# Patient Record
Sex: FEMALE | Employment: OTHER | URBAN - METROPOLITAN AREA
[De-identification: names, ages, dates, MRNs, and addresses within clinical notes are randomized per-mention and may not be internally consistent; named-entity substitution may affect disease eponyms.]

---

## 2020-11-05 LAB
ALBUMIN SERPL-MCNC: 3.3 G/DL (ref 3.5–4.6)
ALP BLD-CCNC: 51 U/L (ref 40–130)
ALT SERPL-CCNC: 7 U/L (ref 0–33)
ANION GAP SERPL CALCULATED.3IONS-SCNC: 23 MEQ/L (ref 9–15)
AST SERPL-CCNC: 18 U/L (ref 0–35)
BILIRUB SERPL-MCNC: <0.2 MG/DL (ref 0.2–0.7)
BUN BLDV-MCNC: 19 MG/DL (ref 8–23)
C-REACTIVE PROTEIN: 4 MG/L (ref 0–5)
CALCIUM SERPL-MCNC: 9 MG/DL (ref 8.5–9.9)
CHLORIDE BLD-SCNC: 102 MEQ/L (ref 95–107)
CO2: 13 MEQ/L (ref 20–31)
CREAT SERPL-MCNC: 0.6 MG/DL (ref 0.5–0.9)
D DIMER: 0.51 MG/L FEU (ref 0–0.5)
FERRITIN: 147.9 NG/ML (ref 13–150)
GFR AFRICAN AMERICAN: >60
GFR NON-AFRICAN AMERICAN: >60
GLOBULIN: 3.3 G/DL (ref 2.3–3.5)
GLUCOSE BLD-MCNC: 202 MG/DL (ref 70–99)
HCT VFR BLD CALC: 38 % (ref 37–47)
HEMOGLOBIN: 12.5 G/DL (ref 12–16)
LACTATE DEHYDROGENASE: 203 U/L (ref 135–214)
MCH RBC QN AUTO: 29.4 PG (ref 27–31.3)
MCHC RBC AUTO-ENTMCNC: 32.9 % (ref 33–37)
MCV RBC AUTO: 89.4 FL (ref 82–100)
PDW BLD-RTO: 13.8 % (ref 11.5–14.5)
PLATELET # BLD: 101 K/UL (ref 130–400)
POTASSIUM SERPL-SCNC: 4.3 MEQ/L (ref 3.4–4.9)
RBC # BLD: 4.25 M/UL (ref 4.2–5.4)
SODIUM BLD-SCNC: 138 MEQ/L (ref 135–144)
TOTAL CK: 41 U/L (ref 0–170)
TOTAL PROTEIN: 6.6 G/DL (ref 6.3–8)
TROPONIN: <0.01 NG/ML (ref 0–0.01)
WBC # BLD: 1.8 K/UL (ref 4.8–10.8)

## 2020-11-06 ENCOUNTER — OFFICE VISIT (OUTPATIENT)
Dept: GERIATRIC MEDICINE | Age: 85
End: 2020-11-06
Payer: COMMERCIAL

## 2020-11-06 DIAGNOSIS — U07.1 COVID-19: Primary | ICD-10-CM

## 2020-11-06 PROCEDURE — G8484 FLU IMMUNIZE NO ADMIN: HCPCS | Performed by: NURSE PRACTITIONER

## 2020-11-06 PROCEDURE — 1123F ACP DISCUSS/DSCN MKR DOCD: CPT | Performed by: NURSE PRACTITIONER

## 2020-11-06 PROCEDURE — 99308 SBSQ NF CARE LOW MDM 20: CPT | Performed by: NURSE PRACTITIONER

## 2020-11-07 ENCOUNTER — OFFICE VISIT (OUTPATIENT)
Dept: GERIATRIC MEDICINE | Age: 85
End: 2020-11-07
Payer: COMMERCIAL

## 2020-11-07 LAB — PROCALCITONIN: <0.07 NG/ML

## 2020-11-07 PROCEDURE — G8484 FLU IMMUNIZE NO ADMIN: HCPCS | Performed by: INTERNAL MEDICINE

## 2020-11-07 PROCEDURE — 1123F ACP DISCUSS/DSCN MKR DOCD: CPT | Performed by: INTERNAL MEDICINE

## 2020-11-07 PROCEDURE — 99309 SBSQ NF CARE MODERATE MDM 30: CPT | Performed by: INTERNAL MEDICINE

## 2020-11-08 ENCOUNTER — OFFICE VISIT (OUTPATIENT)
Dept: GERIATRIC MEDICINE | Age: 85
End: 2020-11-08
Payer: COMMERCIAL

## 2020-11-08 PROCEDURE — G8484 FLU IMMUNIZE NO ADMIN: HCPCS | Performed by: INTERNAL MEDICINE

## 2020-11-08 PROCEDURE — 1123F ACP DISCUSS/DSCN MKR DOCD: CPT | Performed by: INTERNAL MEDICINE

## 2020-11-08 PROCEDURE — 99309 SBSQ NF CARE MODERATE MDM 30: CPT | Performed by: INTERNAL MEDICINE

## 2020-11-09 ENCOUNTER — OFFICE VISIT (OUTPATIENT)
Dept: GERIATRIC MEDICINE | Age: 85
End: 2020-11-09
Payer: COMMERCIAL

## 2020-11-09 DIAGNOSIS — U07.1 COVID-19: Primary | ICD-10-CM

## 2020-11-09 PROCEDURE — 99308 SBSQ NF CARE LOW MDM 20: CPT | Performed by: NURSE PRACTITIONER

## 2020-11-09 PROCEDURE — 1123F ACP DISCUSS/DSCN MKR DOCD: CPT | Performed by: NURSE PRACTITIONER

## 2020-11-09 PROCEDURE — G8484 FLU IMMUNIZE NO ADMIN: HCPCS | Performed by: NURSE PRACTITIONER

## 2020-11-10 ENCOUNTER — OFFICE VISIT (OUTPATIENT)
Dept: GERIATRIC MEDICINE | Age: 85
End: 2020-11-10
Payer: COMMERCIAL

## 2020-11-10 PROCEDURE — 99309 SBSQ NF CARE MODERATE MDM 30: CPT | Performed by: INTERNAL MEDICINE

## 2020-11-10 PROCEDURE — G8484 FLU IMMUNIZE NO ADMIN: HCPCS | Performed by: INTERNAL MEDICINE

## 2020-11-10 PROCEDURE — 1123F ACP DISCUSS/DSCN MKR DOCD: CPT | Performed by: INTERNAL MEDICINE

## 2020-11-11 ENCOUNTER — OFFICE VISIT (OUTPATIENT)
Dept: GERIATRIC MEDICINE | Age: 85
End: 2020-11-11
Payer: COMMERCIAL

## 2020-11-11 DIAGNOSIS — R09.89 ABNORMAL LUNG SOUNDS: Primary | ICD-10-CM

## 2020-11-11 PROCEDURE — G8484 FLU IMMUNIZE NO ADMIN: HCPCS | Performed by: NURSE PRACTITIONER

## 2020-11-11 PROCEDURE — 99309 SBSQ NF CARE MODERATE MDM 30: CPT | Performed by: NURSE PRACTITIONER

## 2020-11-11 PROCEDURE — 1123F ACP DISCUSS/DSCN MKR DOCD: CPT | Performed by: NURSE PRACTITIONER

## 2020-11-12 ENCOUNTER — OFFICE VISIT (OUTPATIENT)
Dept: GERIATRIC MEDICINE | Age: 85
End: 2020-11-12
Payer: COMMERCIAL

## 2020-11-12 LAB
ALBUMIN SERPL-MCNC: 3.6 G/DL (ref 3.5–4.6)
ALP BLD-CCNC: 50 U/L (ref 40–130)
ALT SERPL-CCNC: <5 U/L (ref 0–33)
ANION GAP SERPL CALCULATED.3IONS-SCNC: 12 MEQ/L (ref 9–15)
AST SERPL-CCNC: 20 U/L (ref 0–35)
BILIRUB SERPL-MCNC: 0.3 MG/DL (ref 0.2–0.7)
BUN BLDV-MCNC: 17 MG/DL (ref 8–23)
C-REACTIVE PROTEIN: 1.3 MG/L (ref 0–5)
CALCIUM SERPL-MCNC: 9.2 MG/DL (ref 8.5–9.9)
CHLORIDE BLD-SCNC: 106 MEQ/L (ref 95–107)
CO2: 22 MEQ/L (ref 20–31)
CREAT SERPL-MCNC: 0.56 MG/DL (ref 0.5–0.9)
D DIMER: 0.63 MG/L FEU (ref 0–0.5)
FERRITIN: 161.6 NG/ML (ref 13–150)
GFR AFRICAN AMERICAN: >60
GFR NON-AFRICAN AMERICAN: >60
GLOBULIN: 3.1 G/DL (ref 2.3–3.5)
GLUCOSE BLD-MCNC: 99 MG/DL (ref 70–99)
HCT VFR BLD CALC: 38.4 % (ref 37–47)
HEMOGLOBIN: 12.7 G/DL (ref 12–16)
LACTATE DEHYDROGENASE: 290 U/L (ref 135–214)
MCH RBC QN AUTO: 29.6 PG (ref 27–31.3)
MCHC RBC AUTO-ENTMCNC: 33.1 % (ref 33–37)
MCV RBC AUTO: 89.4 FL (ref 82–100)
PDW BLD-RTO: 13.2 % (ref 11.5–14.5)
PLATELET # BLD: 147 K/UL (ref 130–400)
POTASSIUM SERPL-SCNC: 4.3 MEQ/L (ref 3.4–4.9)
RBC # BLD: 4.3 M/UL (ref 4.2–5.4)
SODIUM BLD-SCNC: 140 MEQ/L (ref 135–144)
TOTAL CK: 41 U/L (ref 0–170)
TOTAL PROTEIN: 6.7 G/DL (ref 6.3–8)
TSH SERPL DL<=0.05 MIU/L-ACNC: 1.06 UIU/ML (ref 0.44–3.86)
WBC # BLD: 2.2 K/UL (ref 4.8–10.8)

## 2020-11-12 PROCEDURE — G8484 FLU IMMUNIZE NO ADMIN: HCPCS | Performed by: INTERNAL MEDICINE

## 2020-11-12 PROCEDURE — 99309 SBSQ NF CARE MODERATE MDM 30: CPT | Performed by: INTERNAL MEDICINE

## 2020-11-12 PROCEDURE — 1123F ACP DISCUSS/DSCN MKR DOCD: CPT | Performed by: INTERNAL MEDICINE

## 2020-11-12 RX ORDER — ALBUTEROL SULFATE 90 UG/1
1 AEROSOL, METERED RESPIRATORY (INHALATION)
Qty: 1 INHALER | Refills: 3 | Status: SHIPPED | OUTPATIENT
Start: 2020-11-12

## 2020-11-12 RX ORDER — DEXAMETHASONE 0.5 MG/1
6 TABLET ORAL
Qty: 120 TABLET | Refills: 0 | Status: SHIPPED | OUTPATIENT
Start: 2020-11-12 | End: 2020-11-22

## 2020-11-12 RX ORDER — ALBUTEROL SULFATE 90 UG/1
1 AEROSOL, METERED RESPIRATORY (INHALATION) EVERY 4 HOURS
Qty: 1 INHALER | Refills: 3 | Status: SHIPPED | OUTPATIENT
Start: 2020-11-12

## 2020-11-13 ENCOUNTER — OFFICE VISIT (OUTPATIENT)
Dept: GERIATRIC MEDICINE | Age: 85
End: 2020-11-13
Payer: COMMERCIAL

## 2020-11-13 PROCEDURE — G8484 FLU IMMUNIZE NO ADMIN: HCPCS | Performed by: NURSE PRACTITIONER

## 2020-11-13 PROCEDURE — 99309 SBSQ NF CARE MODERATE MDM 30: CPT | Performed by: NURSE PRACTITIONER

## 2020-11-13 PROCEDURE — 1123F ACP DISCUSS/DSCN MKR DOCD: CPT | Performed by: NURSE PRACTITIONER

## 2020-11-14 ENCOUNTER — VIRTUAL VISIT (OUTPATIENT)
Dept: GERIATRIC MEDICINE | Age: 85
End: 2020-11-14
Payer: COMMERCIAL

## 2020-11-14 LAB — PROCALCITONIN: 0.08 NG/ML

## 2020-11-14 PROCEDURE — 99308 SBSQ NF CARE LOW MDM 20: CPT | Performed by: NURSE PRACTITIONER

## 2020-11-14 PROCEDURE — 1123F ACP DISCUSS/DSCN MKR DOCD: CPT | Performed by: NURSE PRACTITIONER

## 2020-11-15 ENCOUNTER — VIRTUAL VISIT (OUTPATIENT)
Dept: GERIATRIC MEDICINE | Age: 85
End: 2020-11-15
Payer: COMMERCIAL

## 2020-11-15 PROCEDURE — 99308 SBSQ NF CARE LOW MDM 20: CPT | Performed by: NURSE PRACTITIONER

## 2020-11-15 PROCEDURE — 1123F ACP DISCUSS/DSCN MKR DOCD: CPT | Performed by: NURSE PRACTITIONER

## 2020-11-16 ENCOUNTER — OFFICE VISIT (OUTPATIENT)
Dept: GERIATRIC MEDICINE | Age: 85
End: 2020-11-16
Payer: COMMERCIAL

## 2020-11-16 PROCEDURE — 1123F ACP DISCUSS/DSCN MKR DOCD: CPT | Performed by: NURSE PRACTITIONER

## 2020-11-16 PROCEDURE — 99308 SBSQ NF CARE LOW MDM 20: CPT | Performed by: NURSE PRACTITIONER

## 2020-11-16 PROCEDURE — G8484 FLU IMMUNIZE NO ADMIN: HCPCS | Performed by: NURSE PRACTITIONER

## 2020-11-18 ENCOUNTER — OFFICE VISIT (OUTPATIENT)
Dept: GERIATRIC MEDICINE | Age: 85
End: 2020-11-18
Payer: COMMERCIAL

## 2020-11-18 DIAGNOSIS — G47.00 INSOMNIA, UNSPECIFIED TYPE: Primary | ICD-10-CM

## 2020-11-18 PROCEDURE — 99308 SBSQ NF CARE LOW MDM 20: CPT | Performed by: NURSE PRACTITIONER

## 2020-11-18 PROCEDURE — G8484 FLU IMMUNIZE NO ADMIN: HCPCS | Performed by: NURSE PRACTITIONER

## 2020-11-18 PROCEDURE — 1123F ACP DISCUSS/DSCN MKR DOCD: CPT | Performed by: NURSE PRACTITIONER

## 2020-11-19 LAB
ALBUMIN SERPL-MCNC: 4 G/DL (ref 3.5–4.6)
ALP BLD-CCNC: 54 U/L (ref 40–130)
ALT SERPL-CCNC: 16 U/L (ref 0–33)
ANION GAP SERPL CALCULATED.3IONS-SCNC: 25 MEQ/L (ref 9–15)
AST SERPL-CCNC: 16 U/L (ref 0–35)
BILIRUB SERPL-MCNC: 0.4 MG/DL (ref 0.2–0.7)
BUN BLDV-MCNC: 42 MG/DL (ref 8–23)
C-REACTIVE PROTEIN: 1.5 MG/L (ref 0–5)
CALCIUM SERPL-MCNC: 9.4 MG/DL (ref 8.5–9.9)
CHLORIDE BLD-SCNC: 104 MEQ/L (ref 95–107)
CO2: 13 MEQ/L (ref 20–31)
CREAT SERPL-MCNC: 0.58 MG/DL (ref 0.5–0.9)
D DIMER: >20 MG/L FEU (ref 0–0.5)
FERRITIN: 177.3 NG/ML (ref 13–150)
GFR AFRICAN AMERICAN: >60
GFR NON-AFRICAN AMERICAN: >60
GLOBULIN: 2.7 G/DL (ref 2.3–3.5)
GLUCOSE BLD-MCNC: 194 MG/DL (ref 70–99)
HCT VFR BLD CALC: 40.2 % (ref 37–47)
HEMOGLOBIN: 13 G/DL (ref 12–16)
LACTATE DEHYDROGENASE: 321 U/L (ref 135–214)
MCH RBC QN AUTO: 29.6 PG (ref 27–31.3)
MCHC RBC AUTO-ENTMCNC: 32.2 % (ref 33–37)
MCV RBC AUTO: 91.8 FL (ref 82–100)
PDW BLD-RTO: 14.2 % (ref 11.5–14.5)
PLATELET # BLD: 188 K/UL (ref 130–400)
POTASSIUM SERPL-SCNC: 4.8 MEQ/L (ref 3.4–4.9)
PROCALCITONIN: 0.07 NG/ML (ref 0–0.15)
RBC # BLD: 4.38 M/UL (ref 4.2–5.4)
SODIUM BLD-SCNC: 142 MEQ/L (ref 135–144)
TOTAL CK: 28 U/L (ref 0–170)
TOTAL PROTEIN: 6.7 G/DL (ref 6.3–8)
TROPONIN: <0.01 NG/ML (ref 0–0.01)
WBC # BLD: 8.3 K/UL (ref 4.8–10.8)

## 2020-11-23 LAB
ANION GAP SERPL CALCULATED.3IONS-SCNC: 11 MEQ/L (ref 9–15)
BUN BLDV-MCNC: 28 MG/DL (ref 8–23)
CALCIUM SERPL-MCNC: 9.3 MG/DL (ref 8.5–9.9)
CHLORIDE BLD-SCNC: 102 MEQ/L (ref 95–107)
CO2: 22 MEQ/L (ref 20–31)
CREAT SERPL-MCNC: 0.51 MG/DL (ref 0.5–0.9)
GFR AFRICAN AMERICAN: >60
GFR NON-AFRICAN AMERICAN: >60
GLUCOSE BLD-MCNC: 169 MG/DL (ref 70–99)
POTASSIUM SERPL-SCNC: 4.4 MEQ/L (ref 3.4–4.9)
SODIUM BLD-SCNC: 135 MEQ/L (ref 135–144)

## 2020-11-25 LAB — D DIMER: 1.74 MG/L FEU (ref 0–0.5)

## 2020-12-08 NOTE — PROGRESS NOTES
Subjective:     CC: COVID-19    HPI:  Shlomo Noyola is a 80 y.o. female was seen today for COVID 19 evaluation. Patient has been COVID 19 positive since 11/5. They were seen with full PPE and observing continued droplet precautions. Condition discussed with nursing staff and treatment reviewed. Recent bloodwork, chest x-ray and vital signs reviewed. Fever curve and oxygen demands reviewed. Nutritional status and intake reviewed. Patient is demonstrating increased respiratory complaints at this time. Patient has not been febrile in the last 24 hours. Patient is able to feed themselves. No past medical history on file. No past surgical history on file. No family history on file.   Social History     Socioeconomic History    Marital status:      Spouse name: Not on file    Number of children: Not on file    Years of education: Not on file    Highest education level: Not on file   Occupational History    Not on file   Social Needs    Financial resource strain: Not on file    Food insecurity     Worry: Not on file     Inability: Not on file    Transportation needs     Medical: Not on file     Non-medical: Not on file   Tobacco Use    Smoking status: Not on file   Substance and Sexual Activity    Alcohol use: Not on file    Drug use: Not on file    Sexual activity: Not on file   Lifestyle    Physical activity     Days per week: Not on file     Minutes per session: Not on file    Stress: Not on file   Relationships    Social connections     Talks on phone: Not on file     Gets together: Not on file     Attends [employer] service: Not on file     Active member of club or organization: Not on file     Attends meetings of clubs or organizations: Not on file     Relationship status: Not on file    Intimate partner violence     Fear of current or ex partner: Not on file     Emotionally abused: Not on file     Physically abused: Not on file     Forced sexual activity: Not on file Other Topics Concern    Not on file   Social History Narrative    Not on file     No current outpatient medications on file prior to visit. No current facility-administered medications on file prior to visit. Review of Systems   Unable to perform ROS: Mental status change       Objective:     Physical Exam  Vitals signs reviewed. Constitutional:       Appearance: Normal appearance. HENT:      Head: Normocephalic and atraumatic. Nose: Nose normal.      Mouth/Throat:      Mouth: Mucous membranes are dry. Eyes:      Extraocular Movements: Extraocular movements intact. Neck:      Musculoskeletal: Normal range of motion. Cardiovascular:      Rate and Rhythm: Normal rate and regular rhythm. Pulmonary:      Breath sounds: No wheezing or rhonchi. Abdominal:      Palpations: Abdomen is soft. Tenderness: There is no abdominal tenderness. Musculoskeletal:         General: No swelling. Skin:     General: Skin is warm and dry. Neurological:      Mental Status: She is alert. She is disoriented. Comments: tremor          . [address]  Lab Results   Component Value Date    FERRITIN 177.3 (H) 11/19/2020     Lab Results   Component Value Date    WBC 8.3 11/19/2020    HGB 13.0 11/19/2020    HCT 40.2 11/19/2020    MCV 91.8 11/19/2020     11/19/2020     Lab Results   Component Value Date     11/23/2020    K 4.4 11/23/2020     11/23/2020    CO2 22 11/23/2020    BUN 28 11/23/2020    CREATININE 0.51 11/23/2020    GLUCOSE 169 11/23/2020    GLUCOSE 112 06/05/2012    CALCIUM 9.3 11/23/2020      Lab Results   Component Value Date    CKTOTAL 28 11/19/2020    TROPONINI <0.010 11/19/2020     Lab Results   Component Value Date    CRP 1.5 11/19/2020      Lab Results   Component Value Date    DDIMER 1.74 ([facility]) 11/25/2020      Lab Results   Component Value Date    CKTOTAL 28 11/19/2020       Xr Chest (2 Vw)    Result Date: 11/11/2020  Scanned in image     . lastchestxray      Assessment & Plan:     Continue with current regimen of  Vitamin C 500mg PO BID  Vitamin D 1000 IU PO QD  Zinc 50 mg PO QD  Lovenox 30mg SQ BID    Patient does require Dexamethasone 6mg PO QD  Patient does not  require antibiotics for concomitant bacterial pneumonia. Continue to monitor blood work:  Weekly D-Dimer, LDH, CRP, CPK, Procalcitonin, Ferritin, Troponin, CBC, BMP. Patient's case discussed with nursing staff and Code Status reviewed.     Juan Pablo Reyes MD

## 2020-12-09 NOTE — PROGRESS NOTES
[address]  Chief Complaint   Patient presents with    Other     covid    Other     bronchitis       The patient is being seen today for acute visit for COVID-19 bronchitis. SUBJECTIVE:  Resident offers no concerns. States she is doing well. Nursing staff report resident remains at her baseline. FAMILY AND SOCIAL HISTORY:  Refer to H&P. No past medical history on file. MEDICATIONS AND ALLERGIES:  Reviewed on chart in Epic. REVIEW OF SYSTEMS:  Resident denies any headache, dizziness, blurred vision, sore throat, shortness of breath, chest pain, abdominal pain, nausea, vomiting, or changes in her bowel or bladder habits. She reports she is eating okay and sleeping okay. PHYSICAL EXAMINATION:  Most recent vital signs, blood pressure 127/66, temp 98.0, heart rate 68, respirations 18, pulse oxing 96% on 2 L of O2.  CONSTITUTIONAL:  Resident is alert and elderly female, in no apparent distress, cooperative with exam.  INTEGUMENT:  Pink and dry. HEENT:  Normocephalic, atraumatic in appearance, hard of hearing. Conjunctivae pink. Sclerae nonicteric. Mucous membranes pink moist.  Neck with no visible masses. CARDIOVASCULAR:  Regular per nursing staff. RESPIRATORY:  lung sounds clear throughout all fields per nursing staff. Respirations even and nonlabored. No use of accessory muscles noted with breathing. No cough noted. ABDOMEN:  Soft with positive bowel sounds per nursing staff. PERIPHERAL VASCULAR:  No edema noted. ASSESSMENT AND PLAN:  COVID bronchitis. Resident is tolerating her antibiotic therapy. She states she is feeling better. We will continue to monitor her closely. No further changes will be made at this time. Return in about 1 day (around 11/15/2020).   Pursuant to the emergency declaration under the [facility] authority and the [employer] Act, this Virtual Visit was conducted, with patient's consent, to reduce the patient's risk of exposure to COVID-19 and provide continuity of care for an established patient. Services were provided through a video synchronous discussion virtually to substitute for in-person clinic visit they will be billed for this visit and they agree to continue with assistance of  staff and via VIRTUAL platform     Patient identification was verified at the start of the visit : YES    Total time spent on this encounter: Not billed by time. Electronically Signed By: Delia Story CNP on 12/01/2020 03:57:06  ______________________________  Delia Story CNP  [id number]  D: 11/30/2020 20:08:45  T: 11/30/2020 20:58:15    cc: - [facility]

## 2020-12-13 PROBLEM — J40 BRONCHITIS: Status: ACTIVE | Noted: 2020-12-13

## 2020-12-13 PROBLEM — U07.1 COVID-19: Status: ACTIVE | Noted: 2020-12-13

## 2020-12-14 NOTE — PROGRESS NOTES
[address]  Chief Complaint   Patient presents with    Other     covid    Other     bronchitis       The patient is being seen today for acute visit for COVID bronchitis. SUBJECTIVE:  Nursing staff report resident remained stable and she has been at her baseline. She has not had any hypoxic episodes. FAMILY AND SOCIAL HISTORY:  Refer to H&P. No past medical history on file. MEDICATIONS AND ALLERGIES:  Reviewed on chart in Epic. REVIEW OF SYSTEMS:  Resident denies any headache, dizziness, blurred vision,sore throat, cough, chest pain, shortness of breath, abdominal pain, nausea, vomiting, or changes in her bowel or bladder habits. She reports she is eating well, sleeping well, and has no pain. PHYSICAL EXAMINATION:  Most recent vital signs:  /78, temp 97.8, heart rate 60, respirations 18, pulse oxing 96% on 2 L of O2. Constitutional:  Resident is alert, elderly female, in no apparent distress, cooperative with exam.  Integument:  Pink, warm, dry. HEENT:  Normocephalic, atraumatic. Hard of hearing. Conjunctivae pink. Sclerae nonicteric. Mucous membranes pink, moist.  Neck:  No visible masses. Cardiovascular:  Heart rate regular per the nursing staff. Respiratory:  Lung sounds diminished in the bases bilateral per nursing staff. No cough noted. No use of accessory muscles with breathing noted. No distress noted. Abdomen:  Flat. No edema noted. ASSESSMENT AND PLAN:  COVID bronchitis:  Resident is tolerating her antibiotic without incident. She has not had any hypoxic episodes or episodes of respiratory distress. We will continue to monitor her closely. I have reviewed this resident's medication and treatment plan as well as most recent lab work. No further changes will be made at this time. Return in about 1 day (around 11/16/2020).   Pursuant to the emergency declaration under the [facility] waiver authority and the Coronavirus Preparedness and Response Supplemental Appropriations Act, this Virtual Visit was conducted, with patient's consent, to reduce the patient's risk of exposure to COVID-19 and provide continuity of care for an established patient. Services were provided through a video synchronous discussion virtually to substitute for in-person clinic visit they will be billed for this visit and they agree to continue with assistance of  staff and via VIRTUAL platform     Patient identification was verified at the start of the visit : YES    Total time spent on this encounter: Not billed by time. Electronically Signed By: Izabel Ibarra. Faby Johnson CNP on 12/13/2020 22:03:58  ______________________________  Izabel Ibarra.  Faby Johnson CNP  [id number]  D: 12/12/2020 22:07:38  T: 12/12/2020 22:49:37    cc: - [facility]

## 2020-12-15 NOTE — PROGRESS NOTES
[address]  Chief Complaint   Patient presents with    Positive For Covid-19    Other     bronchitis       REASON FOR VISIT:  The patient is being seen today for acute visit for COVID bronchitis. SUBJECTIVE:  Resident offers no concerns. States she is doing well. Nursing staff report resident remains at her baseline. FAMILY AND SOCIAL HISTORY:  Refer to H&P. No past medical history on file. MEDICATIONS AND ALLERGIES:  Reviewed on chart at nursing facility. REVIEW OF SYSTEMS:  Resident denies any headache, dizziness, blurred vision, sore throat, cough, chest pain, shortness of breath, abdominal pain, nausea, vomiting, or changes in her bowel or bladder habits. She reports she is eating well, sleeping well, and has no pain. PHYSICAL EXAMINATION:  Most recent vital signs:  BP 97/59, temp 97.5, heart rate 82, respirations 18, pulse oxing 96% on 2 liters. CONSTITUTIONAL:  Resident is alert, elderly female, in no apparent distress, frail in appearance, cooperative with exam.  INTEGUMENT:  Pink, warm, dry. HEENT:  Normocephalic, atraumatic. Hard of hearing. Conjunctivae pink. Sclerae nonicteric. Mucous membranes pink, moist.  No oropharyngeal exudates. NECK:  Supple. No cervical or clavicular lymphadenopathy. CARDIOVASCULAR:  Regular rate and rhythm. No murmurs, gallops, or rubs noted. RESPIRATORY:  Lung sounds are clear throughout all fields. Respirations even, nonlabored. She is on O2 at 2 liters. Pulse oxing 96%. No use of accessory muscles with breathing. No cough noted. ABDOMEN:  Soft, nontender, nondistended. Normoactive bowel sounds. PV:  Peripheral pulses present. No edema noted. ASSESSMENT AND PLAN:  1. COVID bronchitis. Resident is tolerating her Zithromax without incident. She has not had any hypoxic episodes or episodes of respiratory distress. We will continue to monitor her closely.   I have reviewed this resident's medication and treatment plan, as well as, most recent lab work. No further changes will be made at this time. Return in about 1 day (around 11/17/2020). Electronically Signed By: Delia Story CNP on 12/15/2020 09:51:13  ______________________________  Delia Story CNP  [id number]  D: 12/15/2020 08:27:14  T: 12/15/2020 08:43:16    cc: - [facility]

## 2020-12-28 NOTE — PROGRESS NOTES
[address]  Chief Complaint   Patient presents with    Chest Pain    Other     bronchitis       Patient being seen today for acute visit for chest pain and bronchitis. SUBJECTIVE:  Nursing staff report resident remains at her baseline. She did have an episode the other night where she was complaining of chest pain. An EKG was done. It did come back abnormal.  Resident has seen cardiology in the past.  She has not had any further episodes of chest pain since and her breathing is stable per nursing staff. FAMILY/SOCIAL HISTORY:  Refer to H and P. No past medical history on file. MEDICATIONS AND ALLERGIES:  Reviewed on chart at nursing facility. REVIEW OF SYSTEMS:  Resident denies any headache, dizziness, blurred vision, sore throat, cough, chest pain, shortness of breath, abdominal pain, nausea, vomiting, or changes in her bowel or bladder habits. She reports she is eating well, sleeping well, and has no pain. PE:  Most recent vital signs, /64, temp 97.5, heart rate 82, respirations 18, pulse oxing 97% room air. Constitutional:  Resident is alert, elderly female, no apparent distress, pleasant, cooperative with exam.  Integ:  Pink, warm, dry. HEENT:  Normocephalic, atraumatic. Conjunctivae pink. Sclerae nonicteric. Mucous membranes pink, moist.  No oropharyngeal exudate. Neck:  Supple. No cervical or clavicular lymphadenopathy. Cardiovascular:  Regular rate and rhythm. No murmurs, gallops, rubs noted. Respiratory:  Lung sounds clear through all fields. Respirations even, unlabored. She is on O2 at 2 L. No use of accessory muscles with breathing. Abdomen:  Soft, nontender, nondistended, normoactive bowel sounds. PV:  Peripheral pulses present. No edema noted. A&P:   1. Chest pain. Resident has not had further episodes of chest pain or chest discomfort.   Her EKG was abnormal.  I will have nursing staff refer her back to her cardiologist.    2. Bronchitis. Resident's breathing is stable. She is not coughing. She states she is feeling better. I have reviewed this resident's medication and treatment plan, as well as most recent lab work. No further changes will be made at this time. Return in about 1 day (around 11/14/2020). ________________________    Garon Blocker Ratna Bowman [facility], [address] [place] [address]  Office [phone]  Fax [phone]  Cc.  Lizy

## 2020-12-29 PROBLEM — R07.9 CHEST PAIN: Status: ACTIVE | Noted: 2020-12-29

## 2020-12-30 NOTE — PROGRESS NOTES
[address] [facility]  Chief Complaint   Patient presents with    Insomnia       REASON FOR VISIT:  The patient is being seen today for acute visit for insomnia. SUBJECTIVE:  Resident offers no concerns. States she is doing well. Nursing staff report resident remains at her baseline. FAMILY AND SOCIAL HISTORY:  Refer to H&P. No past medical history on file. MEDICATIONS AND ALLERGIES:  Reviewed on chart at nursing facility. REVIEW OF SYSTEMS:  Resident denies any headache, dizziness, blurred vision, sore throat, cough, chest pain, shortness of breath, abdominal pain, nausea, vomiting, or changes in her bowel or bladder habits. She reports she is eating well, sleeping well, and has no pain. PHYSICAL EXAMINATION:  Most recent vital signs:  /74, temp 97.3, heart rate 75, respirations 18, pulse oxing 94% on room air, weight 125. CONSTITUTIONAL:  Resident is alert, elderly, frail female, in no apparent distress. INTEGUMENT:  Pink, warm, dry. HEENT:  Normocephalic, atraumatic. Hard of hearing. Conjunctivae pink. Sclerae nonicteric. Mucous membranes pink, moist.  No oropharyngeal exudates. NECK:  Supple. No cervical or clavicular lymphadenopathy. CARDIOVASCULAR:  Regular rate and rhythm. No murmurs, gallops, or rubs noted. RESPIRATORY:  Lung sounds are clear throughout all fields. Respirations even, nonlabored. No use of accessory muscles with breathing. No cough noted. ABDOMEN:  Soft, nontender, nondistended. Normoactive bowel sounds. PV:  Peripheral pulses present. No edema noted. ASSESSMENT AND PLAN:  1. Insomnia. Resident does have melatonin as ordered. I have explained to her that she needs to ask for it. I have advised nursing staff that she is complaining of insomnia. No further changes will be made at this time. We will continue to monitor resident for overall comfort, function, and safety.     Return in about 1 day (around 11/19/2020). Electronically Signed By: Lesley Barone. Libra Anderson CNP on 12/30/2020 14:53:43  ______________________________  Lesley Anderson CNP [id number]  D: 12/28/2020 17:17:46  T: 12/28/2020 18:15:49    cc: - [facility]

## 2020-12-30 NOTE — PROGRESS NOTES
[address]  Chief Complaint   Patient presents with    Positive For Covid-19       The patient is being seen today for COVID-19. SUBJECTIVE:  Nursing staff report resident remains at her baseline. FAMILY AND SOCIAL HISTORY:  Refer to H and P. No past medical history on file. MEDICATIONS AND ALLERGIES:  Reviewed on chart at nursing facility. REVIEW OF SYSTEMS:  Resident denies any headache, dizziness, blurred vision,sore throat, chest pain, shortness of breath. She does state she is a little short of breath when she exerts herself. She is having no cough, no abdominal pain, no nausea, no vomiting, no difficulty with bladder or bowel function. She reports she is eating, she is sleeping, and she has no pain. PHYSICAL EXAMINATION:  Most recent vital signs, /60, temp 97.3, heart rate 62, respirations 18, pulse oxing 95% on room air, weight 112. CONSTITUTIONAL:  Resident is alert, elderly, frail female, in no apparent distress, pleasant and cooperative with exam.  INTEGUMENT:  Pink, warm, dry. HEENT:  Normocephalic, atraumatic. Hard of hearing. Conjunctivae pink. Sclerae nonicteric. Mucous membranes pink, moist.  No oropharyngeal exudate. NECK:  Supple. No cervical or clavicular lymphadenopathy. CARDIOVASCULAR:  Regular rate and rhythm. No murmurs, gallops or rubs noted. RESPIRATORY:  Lung sounds diminished through all fields. Respirations even and nonlabored. ABDOMEN:  Soft, nontender, nondistended, normoactive bowel sounds. PV:  Peripheral pulses present. She does have a trace of edema to her left foot. ASSESSMENT AND PLAN:    1. COVID-19. Resident remains stable. She does not have any hypoxic episodes, episodes of respiratory distress or use of accessory muscles of breathing. She is currently pulse oxing 95% on room air. I have reviewed this resident's medication and treatment plan, as well as most recent lab work.   No further changes will be made at this time. Return in about 1 day (around 11/7/2020). Electronically Signed By: Kathie Hays. Niko Ayala CNP on 12/30/2020 16:06:33  ______________________________  Kathie Hays.  Niko Ayala CNP  [id number]  D: 12/29/2020 14:04:19  T: 12/29/2020 15:39:40    cc: - [facility]

## 2021-01-02 PROBLEM — G47.00 INSOMNIA: Status: ACTIVE | Noted: 2021-01-02

## 2021-01-03 PROBLEM — R09.89 ABNORMAL LUNG SOUNDS: Status: ACTIVE | Noted: 2021-01-03

## 2021-01-03 NOTE — PROGRESS NOTES
[address] [facility]  Chief Complaint   Patient presents with    Other     abnormal lung sounds       The patient is being seen today for acute visit for her abnormal lung sounds. SUBJECTIVE:  Nursing staff report resident's pulse ox dropped slightly. She is now 95% on room air. She was 96% prior. They also report that she has abnormal lung sounds. Otherwise, no concerns. FAMILY AND SOCIAL HISTORY:  Refer to H&P. No past medical history on file. MEDICATIONS AND ALLERGIES:  Reviewed on chart at nursing facility. REVIEW OF SYSTEMS:  Resident denies any headache, dizziness, blurred vision, sore throat, cough, abdominal pain, nausea, vomiting, or changes in her bowel or bladder habits. She reports she is not having any shortness of breath or chest discomfort. PHYSICAL EXAMINATION:  Most recent vital signs:  /78, temp 98.5, heart rate 54, respirations 18, pulse oxing 95% on room air currently, weight 110.5. CONSTITUTIONAL:  Resident is alert, elderly female, in no apparent distress, pleasant and cooperative with exam.  INTEGUMENT:  Pink, warm, dry. HEENT:  Normocephalic, atraumatic. Hard of hearing. Conjunctivae pink. Sclerae nonicteric. Mucous membranes pink, moist.  No oropharyngeal exudate. NECK:  Supple. No cervical or clavicular lymphadenopathy. CARDIOVASCULAR:  Regular rate and rhythm. No murmurs, gallops or rubs noted. RESPIRATORY:  Lung sounds with crackles in the bases bilaterally. Respirations even and nonlabored. No use of accessory muscles with breathing. She is currently 95% pulse ox on room air. ABDOMEN:  Soft, nontender, nondistended, normoactive bowel sounds. PV:  Peripheral pulses present. She does have a trace of edema to her bilateral lower extremities. ASSESSMENT AND PLAN:  Abnormal lung sounds. I will order a chest x-ray AP and lateral in the a.m. as resident's pulse ox is stable and she is afebrile.   I have reviewed the resident's medication and treatment plan as well as most recent lab work. No further changes will be made at this time. Return in about 1 day (around 11/12/2020). ________________________    Conrad DICKERSON, [address]  Office [phone]  Fax [phone]  Cc.  Lizy

## 2021-01-03 NOTE — PROGRESS NOTES
[address]  Chief Complaint   Patient presents with    Positive For Covid-19       REASON FOR VISIT:  The patient is being seen today for acute visit for COVID-19. SUBJECTIVE:  Nursing staff report resident remains at her baseline. FAMILY AND SOCIAL HISTORY:  Refer to H&P. No past medical history on file. MEDICATIONS AND ALLERGIES:  Reviewed on chart at nursing facility. REVIEW OF SYSTEMS:  Cannot be obtained at the time of my visit as resident is sleeping. PHYSICAL EXAMINATION:  Most recent vital signs:  /84, temp 97.8, heart rate 84, respirations 18, pulse oxing 95% on room air. CONSTITUTIONAL:  Resident in sleeping, but arousable elderly female, in no apparent distress, pleasant, cooperative with exam.  INTEGUMENT:  Pink, warm, dry. HEENT:  Normocephalic, atraumatic. Conjunctivae pink. Sclerae nonicteric. Mucous membranes pink, moist.  No oropharyngeal exudates. NECK:  Supple. No cervical or clavicular lymphadenopathy. CARDIOVASCULAR:  Regular rate and rhythm. No murmurs, gallops, or rubs noted. RESPIRATORY:  lung sounds are clear throughout all fields. Respirations even, nonlabored. No use of accessory muscles with breathing. ABDOMEN:  Soft, nontender, nondistended. Normoactive bowel sounds. PV:  Peripheral pulses present. No edema noted. ASSESSMENT AND PLAN:  1. COVID-19. Resident's respiratory status has been stable. She has not had any hypoxic episodes or episodes of respiratory distress. We will continue to monitor her closely. I have reviewed this resident's medication and treatment plan, as well as, most recent lab work. No further changes will be made at this time. Return in about 1 day (around 11/10/2020). Electronically Signed By: Korey Jo CNP on 01/03/2021 16:39:00  ______________________________  Korey Caro.  Lucy Jo CNP  /[id number]  D: 01/03/2021 12:53:41  T: 01/03/2021 13:40:17    cc: [facility]

## 2022-02-28 ENCOUNTER — OFFICE VISIT (OUTPATIENT)
Dept: GERIATRIC MEDICINE | Age: 87
End: 2022-02-28
Payer: COMMERCIAL

## 2022-02-28 DIAGNOSIS — G20 PARKINSON'S DISEASE (HCC): ICD-10-CM

## 2022-02-28 DIAGNOSIS — K59.00 CONSTIPATION, UNSPECIFIED CONSTIPATION TYPE: ICD-10-CM

## 2022-02-28 DIAGNOSIS — I10 PRIMARY HYPERTENSION: Primary | ICD-10-CM

## 2022-02-28 PROCEDURE — 99309 SBSQ NF CARE MODERATE MDM 30: CPT | Performed by: NURSE PRACTITIONER

## 2022-02-28 PROCEDURE — G8484 FLU IMMUNIZE NO ADMIN: HCPCS | Performed by: NURSE PRACTITIONER

## 2022-02-28 PROCEDURE — 1123F ACP DISCUSS/DSCN MKR DOCD: CPT | Performed by: NURSE PRACTITIONER

## 2022-03-03 ENCOUNTER — OFFICE VISIT (OUTPATIENT)
Dept: GERIATRIC MEDICINE | Age: 87
End: 2022-03-03
Payer: COMMERCIAL

## 2022-03-03 DIAGNOSIS — I10 PRIMARY HYPERTENSION: ICD-10-CM

## 2022-03-03 DIAGNOSIS — M15.9 OSTEOARTHRITIS OF MULTIPLE JOINTS, UNSPECIFIED OSTEOARTHRITIS TYPE: Primary | ICD-10-CM

## 2022-03-03 PROCEDURE — 99304 1ST NF CARE SF/LOW MDM 25: CPT | Performed by: INTERNAL MEDICINE

## 2022-03-03 PROCEDURE — 1123F ACP DISCUSS/DSCN MKR DOCD: CPT | Performed by: INTERNAL MEDICINE

## 2022-03-03 PROCEDURE — G8484 FLU IMMUNIZE NO ADMIN: HCPCS | Performed by: INTERNAL MEDICINE

## 2022-03-13 PROBLEM — G20 PARKINSON'S DISEASE (HCC): Status: ACTIVE | Noted: 2022-03-13

## 2022-03-13 PROBLEM — K59.00 CONSTIPATION: Status: ACTIVE | Noted: 2022-03-13

## 2022-03-13 PROBLEM — I10 PRIMARY HYPERTENSION: Status: ACTIVE | Noted: 2022-03-13

## 2022-03-13 PROBLEM — G20.A1 PARKINSON'S DISEASE: Status: ACTIVE | Noted: 2022-03-13

## 2022-03-14 NOTE — PROGRESS NOTES
Organizations: Not on file    Attends Club or Organization Meetings: Not on file    Marital Status: Not on file   Intimate Partner Violence:     Fear of Current or Ex-Partner: Not on file    Emotionally Abused: Not on file    Physically Abused: Not on file    Sexually Abused: Not on file   Housing Stability:     Unable to Pay for Housing in the Last Year: Not on file    Number of [place] in the Last Year: Not on file    Unstable Housing in the Last Year: Not on file       Allergies: Patient has no known allergies. NF MEDICATIONS REVIEWED    ROS:   Constitutional: There are no reports of behavioral issues, change in appetite, fever, or increased weakness. No bleeding issues. Respiratory: denies SOB, dyspnea, dyspnea on exertion  Cardiovascular: denies CP, lightheadedness, palpitations, PND, orthopnea, or claudication. GI: No N/V/D. She reports constipation. : no reports of dysuria, frequency or urgency  Extremities: No reports of pain issues, edema  Psych: at baseline, forgetful    Physical exam:   Blood pressure 116/64, temperature 97.8, heart rate 70, respirations 18, pulse ox 97% room air, weight 112 pounds. Constitutional: Awake and alert sitting up in recliner, general weakness  HEENT: Normocephalic, hard of hearing,intact facial symmetry, conjunctiva pink, sclera non icteric,  buccal mucosa pink and moist  Speech clear. NECK: - no cervical or clavicular lymphadenopathy, euthyroid, no mass visualized  Cardiovascular: Regular rate, and rhythm. No murmurs, gallops or rubs noted. Respiratory: LCTA, even unlabored respirations, no accessory muscle use noted  GI: abdomen NT, +BS x 4 Q, ND  : incontinent of urine  Extremities: no ankle edema, PPP  SKELETAL: no redness, or swelling over joints.  Limited ROM but spontaneous movement x 4   Psych: pleasantly confused, repetitive , good judgement, normal thought content  SKIN: no gross skin lesions noted on visualized skin, warm and dry    ASSESSMENT:     Diagnosis Orders   1. Primary hypertension     2. Parkinson's disease ([address]     3. Constipation, unspecified constipation type         PLAN:  Pt/POA agrees with POC   MiraLAX 17 g p.o. daily hold for loose stools.     Genie Carbone, APRN - CNP  [facility]  [address] [place] [address], [facility] [address]  P: [phone]

## 2022-03-28 NOTE — PROGRESS NOTES
Vincenzo Estrella : 1925 DOS:   A [facility] serving [place] and [address] [facility]  Seen for transfer of care. HISTORY OF PRESENT ILLNESS: This is a long-term care resident under the care of the  [facility] service. Patient has been a diabetic. Has hypertension and Parkinson's. Patient is primarily not [place] speaking and has been clinically stable in this facility. Patient  has not recently had fevers or chills. No new falls. No change in her bowel or bladder habits. Nursing staff report patient has been functionally independent. MEDICATIONS: Her medications included Sinemet 25/100 one tablet 4 times daily, is on  loperamide, is on meclizine 12.5 mg daily, is on melatonin 3 mg 2 times daily, lisinopril 20 mg  twice daily, Norvasc 5 mg daily, pravastatin 20 mg daily, trazodone 50 mg half tablet daily,  vitamin D3.    REVIEW OF SYSTEMS: Patient denied chest pain, palpitations. She is quite dysarthric at  baseline. Patient is pain-free. Patient has not had recent functional decline. Review of systems  is otherwise unremarkable and unobtainable. OBJECTIVE: Appeared chronically ill. Pupils are small, poorly reactive. Oral mucosa is moist.  Chest showed no crackles, no wheezing. Cardiovascular exam showed a regular rate. Abdomen  is soft, nontender. Extremities showed trace dorsal pedal pulse. ASSESSMENT AND PLAN:  1. Parkinson's. Patient is on Sinemet and is tolerating it well. No evidence of acute  spasticity or acute flare. 2. Degenerative joint disease. Continue the antiinflammatories. 3. Hypertension. Blood pressure stable. No orthostasis. Is on beta blocker. We will follow  clinically.   ______________________________  Tang Warner M.D.

## 2022-04-21 ENCOUNTER — OFFICE VISIT (OUTPATIENT)
Dept: GERIATRIC MEDICINE | Age: 87
End: 2022-04-21
Payer: COMMERCIAL

## 2022-04-21 DIAGNOSIS — E78.5 HYPERLIPIDEMIA, UNSPECIFIED HYPERLIPIDEMIA TYPE: Primary | ICD-10-CM

## 2022-04-21 DIAGNOSIS — I10 PRIMARY HYPERTENSION: ICD-10-CM

## 2022-04-21 DIAGNOSIS — R63.4 ABNORMAL WEIGHT LOSS: ICD-10-CM

## 2022-04-21 PROCEDURE — 1123F ACP DISCUSS/DSCN MKR DOCD: CPT | Performed by: NURSE PRACTITIONER

## 2022-04-21 PROCEDURE — 99309 SBSQ NF CARE MODERATE MDM 30: CPT | Performed by: NURSE PRACTITIONER

## 2022-05-02 PROBLEM — R63.4 ABNORMAL WEIGHT LOSS: Status: ACTIVE | Noted: 2022-05-02

## 2022-05-02 PROBLEM — E78.5 HYPERLIPIDEMIA: Status: ACTIVE | Noted: 2022-05-02

## 2022-05-03 NOTE — PROGRESS NOTES
[facility]  4/21/2022    Trav Sal  is a 80 y.o. in the  being seen for monthly visit for   Chief Complaint   Patient presents with    1 Month Follow-Up       HPI patient being seen today for monthly visit for hyperlipidemia abnormal weight loss and hypertension. Nursing staff report residents blood pressure has been running on the low side and she is on several blood pressure medications she has also experienced some weight loss otherwise no concerns. They have had no recent falls with injuries. They are not complaining of any un addressed pain issues. Have had no recent choking or dysphagia issues. NO agitation or unusual mental behavioral issues. No past medical history on file. No past surgical history on file. No family history on file. Social History     Socioeconomic History    Marital status:      Spouse name: Not on file    Number of children: Not on file    Years of education: Not on file    Highest education level: Not on file   Occupational History    Not on file   Tobacco Use    Smoking status: Not on file    Smokeless tobacco: Not on file   Substance and Sexual Activity    Alcohol use: Not on file    Drug use: Not on file    Sexual activity: Not on file   Other Topics Concern    Not on file   Social History Narrative    Not on file     Social Determinants of Health     Financial Resource Strain:     Difficulty of Paying Living Expenses: Not on file   Food Insecurity:     Worried About Running Out of Food in the Last Year: Not on file    Arely of Food in the Last Year: Not on file   Transportation Needs:     Lack of Transportation (Medical): Not on file    Lack of Transportation (Non-Medical):  Not on file   Physical Activity:     Days of Exercise per Week: Not on file    Minutes of Exercise per Session: Not on file   Stress:     Feeling of Stress : Not on file   Social Connections:     Frequency of Communication with Friends and Family: Not on file    Frequency of Social Gatherings with Friends and Family: Not on file    Attends [employer] Services: Not on file    Active Member of Clubs or Organizations: Not on file    Attends Club or Organization Meetings: Not on file    Marital Status: Not on file   Intimate Partner Violence:     Fear of Current or Ex-Partner: Not on file    Emotionally Abused: Not on file    Physically Abused: Not on file    Sexually Abused: Not on file   Housing Stability:     Unable to Pay for Housing in the Last Year: Not on file    Number of [place] in the Last Year: Not on file    Unstable Housing in the Last Year: Not on file       Allergies: Patient has no known allergies. NF MEDICATIONS REVIEWED    ROS:   Constitutional: There are no reports of behavioral issues, change in appetite, fever, or increased weakness. No bleeding issues. Respiratory: denies SOB, dyspnea, dyspnea on exertion  Cardiovascular: denies CP, lightheadedness, palpitations, PND, orthopnea, or claudication. GI: No N/V/D. : no reports of dysuria, frequency or urgency  Extremities: No reports of pain issues, edema  Psych: at baseline confusion    Physical exam:   Blood pressure 118/58, temperature 97.5, heart rate 68, respirations 18, pulse ox 96% room air, weight 100.5 pounds. Constitutional: Awake and alert sitting up in recliner, general weakness  HEENT: Normocephalic, hard of hearing,conjunctiva pink, sclera non icteric,  buccal mucosa pink and moist  Speech clear. NECK: - no cervical or clavicular lymphadenopathy, euthyroid, no mass visualized  Cardiovascular: Regular rate with occasional ectopic beats  Respiratory: LCTA, even unlabored respirations, no accessory muscle use noted  GI: abdomen NT, +BS x 4 Q, ND  : incontinent of urine  Extremities: no ankle edema, PPP  SKELETAL: no redness, or swelling over joints.  Limited ROM but spontaneous movement x 4   Psych: pleasantly confused,  normal thought content  SKIN: no gross skin lesions noted on visualized skin, warm and dry    ASSESSMENT:     Diagnosis Orders   1. Hyperlipidemia, unspecified hyperlipidemia type     2. Abnormal weight loss     3. Primary hypertension         PLAN:  Pt/POA agrees with POC   Most recent lipid panel total cholesterol 118, triglycerides 59, HDL 46, LDL 60, I will DC her statin. Weekly weights notify MD for 5 pounds or greater weight loss. She is followed by the dietitian. Metoprolol hold if systolic BP is less than 468 or diastolic BP is less than 60 or heart rate is less than 60  Amlodipine hold if systolic BP is less than 611 or heart rate is less than 60    Please note this report is partially produced by using speech recognition hardware. It may contain errors related to the system, including grammar, punctuation and spelling as well as words and phrases that may seem inaccurate. For any questions or concerns feel free to contact me for clarification     Return in about 1 month (around 5/21/2022), or if symptoms worsen or fail to improve, for chronic conditions. ________________________    Sarah Amor  [facility] APRN, [address] [facility], [address] [facility]  Office [phone]  Fax [phone]

## 2022-05-13 ENCOUNTER — OFFICE VISIT (OUTPATIENT)
Dept: GERIATRIC MEDICINE | Age: 87
End: 2022-05-13
Payer: COMMERCIAL

## 2022-05-13 DIAGNOSIS — K59.00 CONSTIPATION, UNSPECIFIED CONSTIPATION TYPE: ICD-10-CM

## 2022-05-13 DIAGNOSIS — G20 PARKINSON'S DISEASE (HCC): Primary | ICD-10-CM

## 2022-05-13 DIAGNOSIS — I10 PRIMARY HYPERTENSION: ICD-10-CM

## 2022-05-13 PROCEDURE — 99309 SBSQ NF CARE MODERATE MDM 30: CPT | Performed by: INTERNAL MEDICINE

## 2022-05-13 PROCEDURE — 1123F ACP DISCUSS/DSCN MKR DOCD: CPT | Performed by: INTERNAL MEDICINE

## 2022-06-13 NOTE — PROGRESS NOTES
SUBJECTIVE:  This 66-year-old woman was eval in follow-up visit for Parkinson's hypertension chronic constipation. Patient is clinically stable this time without acute pain crisis. Patient has not acute flare in her Parkinson's diagnosis for new aspiration. Patient has been globally weak. Is pain-free at this time. ROS: Noted by cognition  The rest of the 14 point ROS negative    PHYSICAL EXAM: VSS per facility record  #Pupils are small with are reactive oral mucosa is dry chest showed no crackles no wheezing cardiovascular showed a regular rate abdomen soft nontender extremity trace edema    ASSESSMENT & PLAN:   Diagnosis Orders   1. Parkinson's disease ([facility]     2. Primary hypertension     3. Constipation, unspecified constipation type       Continue with current medication regimen    Consider Sinemet at this time continue extra treatments given many markers of bowel regimen notes no acute PACs and has had a slowing per the nursing record continue monitor systolic pressure        No past medical history on file. No past surgical history on file. Current Outpatient Medications on File Prior to Visit   Medication Sig Dispense Refill    albuterol sulfate HFA (PROVENTIL HFA) 108 (90 Base) MCG/ACT inhaler Inhale 1 puff into the lungs every 2 hours as needed for Wheezing or Shortness of Breath 1 Inhaler 3    albuterol sulfate HFA (PROVENTIL HFA) 108 (90 Base) MCG/ACT inhaler Inhale 1 puff into the lungs every 4 hours 1 Inhaler 3     No current facility-administered medications on file prior to visit. No family history on file.     Social History     Socioeconomic History    Marital status:      Spouse name: Not on file    Number of children: Not on file    Years of education: Not on file    Highest education level: Not on file   Occupational History    Not on file   Tobacco Use    Smoking status: Not on file    Smokeless tobacco: Not on file   Substance and Sexual Activity    08/08/2013     Lab Results   Component Value Date    HDL 46 03/15/2018    HDL 67 (H) 08/08/2013     Lab Results   Component Value Date    LDLCALC 60 03/15/2018    [address] 08/08/2013     No results found for: LABVLDL, VLDL  No results found for: [facility]    Lab Results   Component Value Date    TSH 1.060 11/12/2020       Lab Results   Component Value Date    WBC 8.3 11/19/2020    HGB 13.0 11/19/2020    HCT 40.2 11/19/2020    MCV 91.8 11/19/2020     11/19/2020       Please note orders entered on site at facility after discussion with appropriate facility nursing/therapy/ / nutritional staff. Current longstanding medical problems and acute medical issues addressed with staff. Available data and data elements in on site paper chart reviewed and analyzed. Current external consultant notes reviewed in on site chart. Ordered laboratory testing and imaging will be reviewed when available.

## 2022-06-21 LAB
ALBUMIN SERPL-MCNC: 3.5 G/DL (ref 3.5–4.6)
ALP BLD-CCNC: 47 U/L (ref 40–130)
ALT SERPL-CCNC: <5 U/L (ref 0–33)
ANION GAP SERPL CALCULATED.3IONS-SCNC: 11 MEQ/L (ref 9–15)
AST SERPL-CCNC: 10 U/L (ref 0–35)
BASOPHILS ABSOLUTE: 0 K/UL (ref 0–0.2)
BASOPHILS RELATIVE PERCENT: 1 %
BILIRUB SERPL-MCNC: 0.3 MG/DL (ref 0.2–0.7)
BUN BLDV-MCNC: 23 MG/DL (ref 8–23)
CALCIUM SERPL-MCNC: 9.1 MG/DL (ref 8.5–9.9)
CHLORIDE BLD-SCNC: 104 MEQ/L (ref 95–107)
CO2: 23 MEQ/L (ref 20–31)
CREAT SERPL-MCNC: 0.71 MG/DL (ref 0.5–0.9)
EOSINOPHILS ABSOLUTE: 0 K/UL (ref 0–0.7)
EOSINOPHILS RELATIVE PERCENT: 0.4 %
GFR AFRICAN AMERICAN: >60
GFR NON-AFRICAN AMERICAN: >60
GLOBULIN: 2.5 G/DL (ref 2.3–3.5)
GLUCOSE BLD-MCNC: 107 MG/DL (ref 70–99)
HCT VFR BLD CALC: 36.6 % (ref 37–47)
HEMOGLOBIN: 12.1 G/DL (ref 12–16)
LYMPHOCYTES ABSOLUTE: 1.2 K/UL (ref 1–4.8)
LYMPHOCYTES RELATIVE PERCENT: 42 %
MCH RBC QN AUTO: 29.8 PG (ref 27–31.3)
MCHC RBC AUTO-ENTMCNC: 33 % (ref 33–37)
MCV RBC AUTO: 90.3 FL (ref 82–100)
MONOCYTES ABSOLUTE: 0.2 K/UL (ref 0.2–0.8)
MONOCYTES RELATIVE PERCENT: 7.6 %
NEUTROPHILS ABSOLUTE: 1.4 K/UL (ref 1.4–6.5)
NEUTROPHILS RELATIVE PERCENT: 49 %
PDW BLD-RTO: 13.4 % (ref 11.5–14.5)
PLATELET # BLD: 121 K/UL (ref 130–400)
PLATELET SLIDE REVIEW: ABNORMAL
POIKILOCYTES: ABNORMAL
POTASSIUM SERPL-SCNC: 4 MEQ/L (ref 3.4–4.9)
RBC # BLD: 4.06 M/UL (ref 4.2–5.4)
SODIUM BLD-SCNC: 138 MEQ/L (ref 135–144)
TOTAL PROTEIN: 6 G/DL (ref 6.3–8)
TSH SERPL DL<=0.05 MIU/L-ACNC: 0.58 UIU/ML (ref 0.44–3.86)
WBC # BLD: 2.9 K/UL (ref 4.8–10.8)

## 2022-06-27 ENCOUNTER — OFFICE VISIT (OUTPATIENT)
Dept: GERIATRIC MEDICINE | Age: 87
End: 2022-06-27
Payer: COMMERCIAL

## 2022-06-27 DIAGNOSIS — I10 PRIMARY HYPERTENSION: ICD-10-CM

## 2022-06-27 DIAGNOSIS — G20 PARKINSON'S DISEASE (HCC): Primary | ICD-10-CM

## 2022-06-27 DIAGNOSIS — M19.90 OSTEOARTHRITIS, UNSPECIFIED OSTEOARTHRITIS TYPE, UNSPECIFIED SITE: ICD-10-CM

## 2022-06-27 DIAGNOSIS — D72.819 LEUKOPENIA, UNSPECIFIED TYPE: ICD-10-CM

## 2022-06-27 PROCEDURE — 99309 SBSQ NF CARE MODERATE MDM 30: CPT | Performed by: NURSE PRACTITIONER

## 2022-06-27 PROCEDURE — 1123F ACP DISCUSS/DSCN MKR DOCD: CPT | Performed by: NURSE PRACTITIONER

## 2022-07-05 LAB
BASOPHILS ABSOLUTE: 0 K/UL (ref 0–0.2)
BASOPHILS RELATIVE PERCENT: 0.4 %
EOSINOPHILS ABSOLUTE: 0 K/UL (ref 0–0.7)
EOSINOPHILS RELATIVE PERCENT: 0.2 %
HCT VFR BLD CALC: 38.2 % (ref 37–47)
HEMOGLOBIN: 12.5 G/DL (ref 12–16)
LYMPHOCYTES ABSOLUTE: 1.3 K/UL (ref 1–4.8)
LYMPHOCYTES RELATIVE PERCENT: 30 %
MCH RBC QN AUTO: 29.9 PG (ref 27–31.3)
MCHC RBC AUTO-ENTMCNC: 32.6 % (ref 33–37)
MCV RBC AUTO: 91.6 FL (ref 82–100)
MONOCYTES ABSOLUTE: 0.5 K/UL (ref 0.2–0.8)
MONOCYTES RELATIVE PERCENT: 11 %
NEUTROPHILS ABSOLUTE: 2.5 K/UL (ref 1.4–6.5)
NEUTROPHILS RELATIVE PERCENT: 58.4 %
PDW BLD-RTO: 13.4 % (ref 11.5–14.5)
PLATELET # BLD: 124 K/UL (ref 130–400)
RBC # BLD: 4.17 M/UL (ref 4.2–5.4)
WBC # BLD: 4.3 K/UL (ref 4.8–10.8)

## 2022-07-20 PROBLEM — D72.819 LEUKOPENIA: Status: ACTIVE | Noted: 2022-07-20

## 2022-07-20 PROBLEM — M19.90 OSTEOARTHRITIS: Status: ACTIVE | Noted: 2022-07-20

## 2022-07-20 NOTE — PROGRESS NOTES
[facility]  6/27/2022    Blayne Acevedo  is a 80 y.o. in the NF being seen for a acute visit for   Chief Complaint   Patient presents with    1 Month Follow-Up       HPI Patient being seen today for Parkinson's disease, essential HTN, OA and leukopenia. They are eating and drinking at their baseline per nursing. They have had no recent falls with injuries. They are not complaining of any un addressed pain issues. Have had no recent choking or dysphagia issues. NO agitation or unusual mental behavioral issues. No past medical history on file. No past surgical history on file. No family history on file. Social History     Socioeconomic History    Marital status:      Spouse name: Not on file    Number of children: Not on file    Years of education: Not on file    Highest education level: Not on file   Occupational History    Not on file   Tobacco Use    Smoking status: Not on file    Smokeless tobacco: Not on file   Substance and Sexual Activity    Alcohol use: Not on file    Drug use: Not on file    Sexual activity: Not on file   Other Topics Concern    Not on file   Social History Narrative    Not on file     Social Determinants of Health     Financial Resource Strain: Not on file   Food Insecurity: Not on file   Transportation Needs: Not on file   Physical Activity: Not on file   Stress: Not on file   Social Connections: Not on file   Intimate Partner Violence: Not on file   Housing Stability: Not on file       Allergies: Patient has no known allergies. NF MEDICATIONS REVIEWED    ROS:   Constitutional: There are no reports of behavioral issues, change in appetite, fever, or increased weakness. No bleeding issues. Respiratory: denies SOB, dyspnea, dyspnea on exertion  Cardiovascular: denies CP, lightheadedness, palpitations, PND, orthopnea, or claudication. GI: No N/V/D.    : no reports of dysuria, frequency or urgency  Extremities: No reports of pain issues, edema  Psych: at baseline confusion     Physical exam:   /63, temperature 97.9, heart rate 50, respirations 14 pulse ox 98% room air weight 101 pounds. Constitutional: Awake and alert sitting up in recliner, general weakness  HEENT: Normocephalic, hard of hearing,intact facial symmetry, conjunctiva pink, sclera non icteric,  buccal mucosa pink and moist  Speech clear. NECK: - no cervical or clavicular lymphadenopathy, euthyroid, no mass visualized  Cardiovascular: Regular rate, and rhythm. No murmurs, gallops or rubs noted. Respiratory: LCTA, even unlabored respirations, no accessory muscle use noted  GI: abdomen NT, +BS x 4 Q, ND  : incontinent of urine  Extremities: no ankle edema, PPP  SKELETAL: no redness, or swelling over joints. Limited ROM but spontaneous movement x 4   Psych: pleasant, good judgement, normal thought content  SKIN: no gross skin lesions noted on visualized skin, warm and dry    ASSESSMENT:     Diagnosis Orders   1. Parkinson's disease ([facility] [address]        2. Primary hypertension        3. Osteoarthritis, unspecified osteoarthritis type, unspecified site        4. Leukopenia, unspecified type            PLAN:  Pt/POA agrees with POC   Stable. No exacerbation of her symptoms. Continue carbidopa-levodopa as ordered. Blood pressure stable. Continue metoprolol and Norvasc as ordered. No reports of pain. She does have acetaminophen should she require it. CBC 1 week    Return in about 1 week (around 7/4/2022), or if symptoms worsen or fail to improve. Please note this report is partially produced by using speech recognition hardware. It may contain errors related to the system, including grammar, punctuation and spelling as well as words and phrases that may seem inaccurate. For any questions or concerns feel free to contact me for clarification           ________________________    Linda Santos.  Roman DICKERSON, [address], [address] [place] [address]  Office [phone]  Fax [phone]

## 2022-08-29 ENCOUNTER — OFFICE VISIT (OUTPATIENT)
Dept: GERIATRIC MEDICINE | Age: 87
End: 2022-08-29
Payer: COMMERCIAL

## 2022-08-29 DIAGNOSIS — F32.9 MAJOR DEPRESSIVE DISORDER, REMISSION STATUS UNSPECIFIED, UNSPECIFIED WHETHER RECURRENT: ICD-10-CM

## 2022-08-29 DIAGNOSIS — G47.00 INSOMNIA, UNSPECIFIED TYPE: ICD-10-CM

## 2022-08-29 DIAGNOSIS — I10 PRIMARY HYPERTENSION: ICD-10-CM

## 2022-08-29 DIAGNOSIS — K59.00 CONSTIPATION, UNSPECIFIED CONSTIPATION TYPE: ICD-10-CM

## 2022-08-29 DIAGNOSIS — D69.6 THROMBOCYTOPENIA (HCC): ICD-10-CM

## 2022-08-29 DIAGNOSIS — E78.5 HYPERLIPIDEMIA, UNSPECIFIED HYPERLIPIDEMIA TYPE: ICD-10-CM

## 2022-08-29 DIAGNOSIS — E11.9 TYPE 2 DIABETES MELLITUS WITHOUT COMPLICATION, UNSPECIFIED WHETHER LONG TERM INSULIN USE (HCC): ICD-10-CM

## 2022-08-29 DIAGNOSIS — M19.90 OSTEOARTHRITIS, UNSPECIFIED OSTEOARTHRITIS TYPE, UNSPECIFIED SITE: ICD-10-CM

## 2022-08-29 DIAGNOSIS — G20 PARKINSON'S DISEASE (HCC): Primary | ICD-10-CM

## 2022-08-29 PROCEDURE — 99318 PR E/M ANNUAL NURSING FACILITY ASSESS STABLE 30 MIN: CPT | Performed by: NURSE PRACTITIONER

## 2022-08-29 PROCEDURE — 3044F HG A1C LEVEL LT 7.0%: CPT | Performed by: NURSE PRACTITIONER

## 2022-08-31 LAB — HBA1C MFR BLD: 5.6 % (ref 4.8–5.9)

## 2022-09-23 PROBLEM — D69.6 THROMBOCYTOPENIA (HCC): Status: ACTIVE | Noted: 2022-09-23

## 2022-09-23 PROBLEM — E11.9 TYPE 2 DIABETES MELLITUS WITHOUT COMPLICATION (HCC): Status: ACTIVE | Noted: 2022-09-23

## 2022-09-23 PROBLEM — F32.9 MAJOR DEPRESSIVE DISORDER: Status: ACTIVE | Noted: 2022-09-23

## 2022-09-24 NOTE — PROGRESS NOTES
Nursing Home:  [address] [place] [address]    The patient is being seen today for annual H&P:  Chief Complaint   Patient presents with    Annual Exam         SUBJECTIVE: Patient being seen today for annual H&P. Patient is DNR CC CODE STATUS. Patient consumes regular diet regular consistency with supplements. Patient's primary diagnosis are Parkinson's disease, primary hypertension, type 2 diabetes, hyperlipidemia, major depressive disorder, osteoarthritis, insomnia, constipation, and thrombocytopenia. FAMILY AND SOCIAL HISTORY:  Refer to H&P. No past medical history on file. No family history on file. Social History     Socioeconomic History    Marital status:      Spouse name: Not on file    Number of children: Not on file    Years of education: Not on file    Highest education level: Not on file   Occupational History    Not on file   Tobacco Use    Smoking status: Not on file    Smokeless tobacco: Not on file   Substance and Sexual Activity    Alcohol use: Not on file    Drug use: Not on file    Sexual activity: Not on file   Other Topics Concern    Not on file   Social History Narrative    Not on file     Social Determinants of Health     Financial Resource Strain: Not on file   Food Insecurity: Not on file   Transportation Needs: Not on file   Physical Activity: Not on file   Stress: Not on file   Social Connections: Not on file   Intimate Partner Violence: Not on file   Housing Stability: Not on file     ALLERGIES:  Patient has no known allergies.     MEDICATIONS: Acetaminophen 325 mg 2 tabs p.o. every 4 as needed for fever greater than 100 or pain not to exceed 3 g in 24 hours, acetaminophen 325 mg 2 tabs p.o. twice daily, artificial tears solution 0.4% 1 drop both eyes 4 times daily, carbidopa-levodopa  mg tab p.o. 4 times daily, Colace capsule 100 mg capsule p.o. daily, Dulcolax suppository 10 mg rectal suppository as needed, I-Luiz plus lutein capsule 1 tab p.o. twice daily, Rolanda-Duarte liquid 100 mg per 5 mL give 10 mL p.o. every 4 as needed, melatonin 3 mg tab 2 tabs p.o. daily, metoprolol tartrate 25 mg tab p.o. twice daily hold if systolic BP less than 317 diastolic BP less than 60 or heart rate less than 60, milk of magnesia suspension 1200 mg per 15 mL give 30 mL p.o. daily as needed, MiraLAX packet 17 g p.o. daily hold for loose stools, Mylanta suspension 200-200-20 milligrams per 5 mL give 30 mL p.o. every 4 as needed, Norvasc 5 mg tab 1 tab p.o. twice daily hold if systolic BP less than 312 or heart rate less than 60, trazodone 50 mg tab give one half tab p.o. daily, vitamin D3 25 MCG tab 1 tab p.o. daily. REVIEW OF SYSTEMS:  Resident denies any headache, dizziness, blurred vision. She denies any fever, chills, sore throat. She denies any rashes, bruises, or open areas. She denies any chest pain, chest discomfort, or heart palpitations. She denies any shortness of breath or cough. She denies any nausea, vomiting, or abdominal pain. She denies any urinary urgency, frequency, or dysuria. She denies any constipation or diarrhea. She states she is eating okay, sleeping okay, and she currently has no pain. PHYSICAL EXAMINATION:  Most recent vital signs: Blood pressure 125/58, temp 97.4, heart rate 53, respirations 18, pulse ox 95%, weight 106.8 pounds. Constitutional:  Resident is a 80 y.o. female alert, frail, pleasant, and cooperative with exam.  No apparent distress. Integument:  Pink, warm, dry. No visible rashes, bruises, or open areas. HEENT:  Normocephalic, atraumatic. External ears appear to be within normal limits. Hard of hearing. Conjunctivae pink. Sclerae nonicteric. Mucous membranes pink, moist.  No oropharyngeal exudate. Neck:  Supple. No cervical or clavicular lymphadenopathy. No masses noted. Cardiovascular:  Heart rate regular rate and rhythm. No murmurs, gallops, rubs noted.     Respiratory:  Lung sounds Normal.  Respirations nonlabored. The patient is not using accessory muscles with breathing. Current pulse ox 95% room air. Abdomen:  Soft, nontender, nondistended, normoactive bowel sounds. No masses noted. Musculoskeletal: No muscle tenderness. No joint tenderness. PV:  Peripheral pulses present. She  does not have any edema to BLE. Psychological: Mood stable. Affect pleasant. Speech normal rate and tone. ASSESSMENT AND PLAN:      Diagnosis Orders   1. Parkinson's disease ([facility] [address]        2. Primary hypertension        3. Type 2 diabetes mellitus without complication, unspecified whether long term insulin use ([facility] [address]        4. Hyperlipidemia, unspecified hyperlipidemia type        5. Major depressive disorder, remission status unspecified, unspecified whether recurrent        6. Osteoarthritis, unspecified osteoarthritis type, unspecified site        7. Insomnia, unspecified type        8. Constipation, unspecified constipation type        9. Thrombocytopenia (HCC)             No exacerbation of symptoms. Continue carbidopa-levodopa as ordered. Blood pressure stable. Continue metoprolol and Norvasc as ordered with parameters. Will adjust as needed. Patient is not on a diabetic diet nor on diabetic medication. HgbAic in am.  Patient liver enzymes within normal limits. She is not on statin therapy. No lipid panel done as she has aged out of requirement. Patient has not had any depressive episodes. Her mood is stable. No current reports of pain. She does have routine Tylenol as well as as needed Tylenol available should she require it. Patient's trazodone and melatonin is effective in meeting her sleep needs. No current complaints of constipation. She does have as needed modality should she require it. Last platelet level 164. No episodes of bleeding or bruising noted. I have reviewed this resident's medication and treatment plan as well as most recent lab work.  HgbA1c in am. No further changes will be made at this time. Return in about 1 year (around 8/29/2023) for chronic conditions. Please note this report is partially produced by using speech recognition hardware. It may contain errors related to the system, including grammar, punctuation and spelling as well as words and phrases that may seem inaccurate. For any questions or concerns feel free to contact me for clarification        Electronically Signed By: Evelia Chaney. Sanam Mancilla CNP on 9/23/22   ______________________________  Evelia Chaney.  Bonita DICKERSON CNP

## 2022-09-27 LAB
ANION GAP SERPL CALCULATED.3IONS-SCNC: 11 MEQ/L (ref 9–15)
BASOPHILS ABSOLUTE: 0 K/UL (ref 0–0.2)
BASOPHILS RELATIVE PERCENT: 0.4 %
BUN BLDV-MCNC: 13 MG/DL (ref 8–23)
BURR CELLS: ABNORMAL
CALCIUM SERPL-MCNC: 9 MG/DL (ref 8.5–9.9)
CHLORIDE BLD-SCNC: 99 MEQ/L (ref 95–107)
CO2: 26 MEQ/L (ref 20–31)
CREAT SERPL-MCNC: 0.56 MG/DL (ref 0.5–0.9)
EOSINOPHILS ABSOLUTE: 0 K/UL (ref 0–0.7)
EOSINOPHILS RELATIVE PERCENT: 0.4 %
GFR AFRICAN AMERICAN: >60
GFR NON-AFRICAN AMERICAN: >60
GLUCOSE BLD-MCNC: 120 MG/DL (ref 70–99)
HCT VFR BLD CALC: 37.9 % (ref 37–47)
HEMOGLOBIN: 12.5 G/DL (ref 12–16)
LYMPHOCYTES ABSOLUTE: 0.7 K/UL (ref 1–4.8)
LYMPHOCYTES RELATIVE PERCENT: 22 %
MCH RBC QN AUTO: 30.4 PG (ref 27–31.3)
MCHC RBC AUTO-ENTMCNC: 33.1 % (ref 33–37)
MCV RBC AUTO: 92.1 FL (ref 82–100)
MONOCYTES ABSOLUTE: 0.4 K/UL (ref 0.2–0.8)
MONOCYTES RELATIVE PERCENT: 12.5 %
NEUTROPHILS ABSOLUTE: 2.2 K/UL (ref 1.4–6.5)
NEUTROPHILS RELATIVE PERCENT: 65 %
OVALOCYTES: ABNORMAL
PDW BLD-RTO: 13.5 % (ref 11.5–14.5)
PLATELET # BLD: 136 K/UL (ref 130–400)
PLATELET SLIDE REVIEW: ABNORMAL
POIKILOCYTES: ABNORMAL
POTASSIUM SERPL-SCNC: 4.3 MEQ/L (ref 3.4–4.9)
RBC # BLD: 4.11 M/UL (ref 4.2–5.4)
SODIUM BLD-SCNC: 136 MEQ/L (ref 135–144)
WBC # BLD: 3.4 K/UL (ref 4.8–10.8)

## 2022-11-02 LAB — VITAMIN D 25-HYDROXY: 34.9 NG/ML

## 2022-11-23 ENCOUNTER — OFFICE VISIT (OUTPATIENT)
Dept: GERIATRIC MEDICINE | Age: 87
End: 2022-11-23

## 2022-11-23 DIAGNOSIS — I10 PRIMARY HYPERTENSION: ICD-10-CM

## 2022-11-23 DIAGNOSIS — M19.90 OSTEOARTHRITIS, UNSPECIFIED OSTEOARTHRITIS TYPE, UNSPECIFIED SITE: ICD-10-CM

## 2022-11-23 DIAGNOSIS — G20 PARKINSON'S DISEASE (HCC): Primary | ICD-10-CM

## 2022-12-18 NOTE — PROGRESS NOTES
[facility]  11[facility]    Ray Alvarez  is a 80 y.o. in the NF being seen for a f/u of   Chief Complaint   Patient presents with    1 Month Follow-Up       HPI lives in [address] [place] [address]  Being seen monthly for chronic illnesses. No past medical history on file. No past surgical history on file. No family history on file. Social History     Socioeconomic History    Marital status:      Spouse name: Not on file    Number of children: Not on file    Years of education: Not on file    Highest education level: Not on file   Occupational History    Not on file   Tobacco Use    Smoking status: Not on file    Smokeless tobacco: Not on file   Substance and Sexual Activity    Alcohol use: Not on file    Drug use: Not on file    Sexual activity: Not on file   Other Topics Concern    Not on file   Social History Narrative    Not on file     Social Determinants of Health     Financial Resource Strain: Not on file   Food Insecurity: Not on file   Transportation Needs: Not on file   Physical Activity: Not on file   Stress: Not on file   Social Connections: Not on file   Intimate Partner Violence: Not on file   Housing Stability: Not on file       Allergies: Patient has no known allergies. NF MEDICATIONS REVIEWED    ROS:  See HPI  Constitutional: There are no reports of behavioral issues, change in appetite, fever, or weakness. No gross bleeding issues. Respiratory: denies SOB, dyspnea, dyspnea on exertion,   Cardiovascular: denies CP, lightheadedness,   GI: No reports of change of bowel habits, no N/V/D/C. : no reports of change in bladder habits  Extremities: No reports of pain issues, no edema    Physical exam:  Blood pressure 131/68, temp 97.8, heart rate 57, respirations 16, pulse ox 95% room air, weight 99.9 pounds. Constitutional: Alert, elderly, frail female, sitting in chair at time of my visit, in no apparent distress. HEENT: normocephalic, very [place] of [place], MMM, no cyanosis.  NO neck mass visualized   Cardiovascular: Regular rate  Respiratory: unlabored respirations, no accessory muscle use noted  GI: abdomen ND  : no bladder tenderness  Extremities: no edema,PPP  Psych: pleasant and able to follow simple commands, normal thought content, speech clear    ASSESSMENT:     Diagnosis Orders   1. Parkinson's disease ([facility] [address]        2. Primary hypertension        3. Osteoarthritis, unspecified osteoarthritis type, unspecified site            PLAN:   Agrees with POC   Stable. No exacerbation in symptoms. No hypertensive or hypotensive events. We will continue to monitor closely. No current complaints of pain. No recent falls or fractures. Return in about 1 month (around 12/23/2022), or if symptoms worsen or fail to improve, for chronic conditions. Pertinent POC, labs, have been reviewed, continue same. Encourage fluids and good nutrition. Stress fall prevention strategies. Nursing to notify Pt/POA for agreement to POC         ________________________    Isaias Abrams. [facility], [address] [facility], [address] [place] [address]  Office [phone]  Fax [phone]      Please note this report is partially produced by using speech recognition hardware. It may contain errors related to the system, including grammar, punctuation and spelling as well as words and phrases that may seem inaccurate.   For any questions or concerns feel free to contact me for clarification

## 2023-01-04 ENCOUNTER — OFFICE VISIT (OUTPATIENT)
Dept: GERIATRIC MEDICINE | Age: 88
End: 2023-01-04

## 2023-01-04 DIAGNOSIS — U07.1 COVID: Primary | ICD-10-CM

## 2023-01-04 DIAGNOSIS — R04.0 EPISTAXIS: ICD-10-CM

## 2023-01-21 PROBLEM — U07.1 COVID: Status: ACTIVE | Noted: 2023-01-21

## 2023-01-22 NOTE — PROGRESS NOTES
[facility]  [address]  [facility] [address] [address]  P: [phone]          2023    HPI:    Alessandra Griffin (:  [phone]) has requested an evaluation for the following concern(s):      Acute visit for CC of COVID-19. DX ON DATE 22  Nurse reports patient found to be positive for Covid on routine screening of patients. She was asymptomatic. Review of Systems  ROS: Nurse/pt reports   Constitutional: There are no reports of behavioral issues,   Had no decrease in appetite, and no low grade fever, is not any weaker. Had  no runny nose, sore throat, or cough. No c/o loss of taste or smell,  Respiratory: c/o SOB, dyspnea, dyspnea on exertion, change in exercise capacity. Her pulse ox did read 90% on the prior day and she was placed on oxygen and has been stable since. Cardiovascular: denies CP, lightheadedness, palpitations, PND, orthopnea, or claudication. GI: No N/V/D. : no reports of dysuria  Extremities: No reports of pain issues, no edema     Prior to Visit Medications    Medication Sig Taking? Authorizing Provider   albuterol sulfate HFA (PROVENTIL HFA) 108 (90 Base) MCG/ACT inhaler Inhale 1 puff into the lungs every 2 hours as needed for Wheezing or Shortness of Breath  JAYLA Rodriguez CNP   albuterol sulfate HFA (PROVENTIL HFA) 108 (90 Base) MCG/ACT inhaler Inhale 1 puff into the lungs every 4 hours  JAYLA Rodriguez CNP       NF MEDICATIONS REVIEWED AND ALLERGIES REVIEWED IN NF CHART          No Known Allergies, No past medical history on file., No past surgical history on file.     PHYSICAL EXAMINATION:  Blood pressure- 124/74 Heart rate- 76   Respiratory rate- 16   Temperature-98.5  Pulse oximetry- 96%    Constitutional: [x] No apparent distress      [x] Abnormal- THIN , FRAIL, CACHEXIA NOTED  Mental status  [x] Alert and awake  [] Oriented to person/place/time [x]Able to follow commands     PLEASANTLY CONFUSED AT BASELINE  Speech is clear, and appropriate. Eyes:  EOM    [x]  Normal  [] Abnormal-  Sclera  [x]  Normal  [] Abnormal -         Discharge [x]  None visible  [] Abnormal -    HENT:   [x] Normocephalic, atraumatic. [] Abnormal   [x] Mouth/Throat: Mucous membranes are moist.     External Ears [x] Normal  [x] Abnormal- [place]    Neck: [x] No visualized mass     Pulmonary/Chest:   Heart sounds    RRR, no MGR noted  Lungs  sounds   LSCTA   [x] Respiratory effort normal.  [x] No visualized signs of difficulty breathing or respiratory distress        [x] Abnormal- oxygen at 2L via nasal cannula. Patient with epistaxis at time of my visit. Musculoskeletal:           [x] Normal range of motion of neck        [] Abnormal-   LIMITED ROM AT BASELINE     Neurological:        [x] No Facial Asymmetry (Cranial nerve 7 motor function)          [x] No gaze palsy                Skin:        [x] No significant exanthematous lesions or discoloration noted on facial skin                    Psychiatric:       [x] Normal Affect [x] No Hallucinations,NORMAL THOUGHT CONTENT, GOOD JUDGEMENT         Other pertinent observable physical exam findings-   Epistaxis at time of my visit  ASSESSMENT/PLAN:     Diagnosis Orders   1. COVID        2. Epistaxis            Standing orders for COVID -19 labs on diagnosis AND q week:  CBC  BMP    medications:  Continue same meds and POC  Ice to bridge of nose and back of neck effective in resolving epistaxis  Will have nursing staff add humidity to oxygen to prevent further epistaxis from occurring. Return in about 1 month (around 2/4/2023), or if symptoms worsen or fail to improve. Pt/POA agrees to JAYLA Maya CNP on 1/22/2023 at 9:33 PM    Please note this report is partially produced by using speech recognition hardware. It may contain errors related to the system, including grammar, punctuation and spelling as well as words and phrases that may seem inaccurate.   For any questions or concerns feel free to contact me for clarification

## 2023-01-30 ENCOUNTER — OFFICE VISIT (OUTPATIENT)
Dept: GERIATRIC MEDICINE | Age: 88
End: 2023-01-30
Payer: COMMERCIAL

## 2023-01-30 DIAGNOSIS — R60.0 BILATERAL LOWER EXTREMITY EDEMA: ICD-10-CM

## 2023-01-30 DIAGNOSIS — G20 PARKINSON'S DISEASE (HCC): ICD-10-CM

## 2023-01-30 DIAGNOSIS — G47.00 INSOMNIA, UNSPECIFIED TYPE: Primary | ICD-10-CM

## 2023-01-30 PROCEDURE — 1123F ACP DISCUSS/DSCN MKR DOCD: CPT | Performed by: NURSE PRACTITIONER

## 2023-01-30 PROCEDURE — G8484 FLU IMMUNIZE NO ADMIN: HCPCS | Performed by: NURSE PRACTITIONER

## 2023-01-30 PROCEDURE — 99309 SBSQ NF CARE MODERATE MDM 30: CPT | Performed by: NURSE PRACTITIONER

## 2023-02-26 NOTE — PROGRESS NOTES
[facility]  1/30/2023    Hernando Anaya  is a 80 y.o. in the NF being seen for a acute visit for   Chief Complaint   Patient presents with    1 Month Follow-Up       HPI Patient being seen today for insomnia, bilateral lower extremity edema, and Parkinson's disease. They are eating and drinking at their baseline per nursing. They have had no recent falls with injuries. They are not complaining of any un addressed pain issues. Have had no recent choking or dysphagia issues. NO agitation or unusual mental behavioral issues. No past medical history on file. No past surgical history on file. No family history on file. Social History     Socioeconomic History    Marital status:      Spouse name: Not on file    Number of children: Not on file    Years of education: Not on file    Highest education level: Not on file   Occupational History    Not on file   Tobacco Use    Smoking status: Not on file    Smokeless tobacco: Not on file   Substance and Sexual Activity    Alcohol use: Not on file    Drug use: Not on file    Sexual activity: Not on file   Other Topics Concern    Not on file   Social History Narrative    Not on file     Social Determinants of Health     Financial Resource Strain: Not on file   Food Insecurity: Not on file   Transportation Needs: Not on file   Physical Activity: Not on file   Stress: Not on file   Social Connections: Not on file   Intimate Partner Violence: Not on file   Housing Stability: Not on file       Allergies: Patient has no known allergies. NF MEDICATIONS REVIEWED    ROS:   Constitutional: There are no reports of behavioral issues, change in appetite, fever, or increased weakness. No bleeding issues. Respiratory: denies SOB, dyspnea, dyspnea on exertion  Cardiovascular: denies CP, lightheadedness, palpitations, PND, orthopnea, or claudication. GI: No N/V/D.    : no reports of dysuria, frequency or urgency  Extremities: No reports of pain issues, edema  Psych: at baseline confusion     Physical exam:   /65, temperature 97.4, heart rate 55, respirations 18, spo2 95% on room air, weight 98.3lbs. Constitutional: Awake and alert, frail, sitting up in recliner, general weakness  HEENT: Normocephalic, [place],intact facial symmetry, conjunctiva pink, sclera non icteric,  buccal mucosa pink and moist  Speech clear. NECK: - no cervical or clavicular lymphadenopathy, euthyroid, no mass visualized  Cardiovascular: Regular rate, and rhythm. No murmurs, gallops or rubs noted. Respiratory: LCTA, even unlabored respirations, no accessory muscle use noted  GI: abdomen NT, +BS x 4 Q, ND  : no suprapubic movement  Extremities: 2+ pitting edema BLE, PPP  SKELETAL: no redness, or swelling over joints. Limited ROM but spontaneous movement x 4   Psych: pleasantly confused, good judgement, normal thought content  SKIN: no gross skin lesions noted on visualized skin, warm and dry    ASSESSMENT:     Diagnosis Orders   1. Insomnia, unspecified type        2. Bilateral lower extremity edema        3. Parkinson's disease ([facility] [address]            PLAN:  Pt/POA agrees with POC   Patient has melatonin that is effective for her  Lasix 20mg po QD x2 days  Ace wrap BLE-knee high, on in the am off in the pm  Elevate legs when in WC/bed/recliner  No exacerbation in symptoms    Return in about 1 month (around 2/28/2023), or if symptoms worsen or fail to improve, for chronic conditions. Please note this report is partially produced by using speech recognition hardware. It may contain errors related to the system, including grammar, punctuation and spelling as well as words and phrases that may seem inaccurate.   For any questions or concerns feel free to contact me for clarification       Jeanette Alaniz, APRN - CNP

## 2023-03-10 LAB
ANION GAP SERPL CALCULATED.3IONS-SCNC: 9 MEQ/L (ref 9–15)
BASOPHILS ABSOLUTE: 0 K/UL (ref 0–0.2)
BASOPHILS RELATIVE PERCENT: 0.5 %
BUN BLDV-MCNC: 17 MG/DL (ref 8–23)
CALCIUM SERPL-MCNC: 9.1 MG/DL (ref 8.5–9.9)
CHLORIDE BLD-SCNC: 105 MEQ/L (ref 95–107)
CO2: 29 MEQ/L (ref 20–31)
CREAT SERPL-MCNC: 0.52 MG/DL (ref 0.5–0.9)
EOSINOPHILS ABSOLUTE: 0 K/UL (ref 0–0.7)
EOSINOPHILS RELATIVE PERCENT: 1 %
GFR SERPL CREATININE-BSD FRML MDRD: >60 ML/MIN/{1.73_M2}
GLUCOSE BLD-MCNC: 80 MG/DL (ref 70–99)
HCT VFR BLD CALC: 32.5 % (ref 37–47)
HEMOGLOBIN: 10.8 G/DL (ref 12–16)
LYMPHOCYTES ABSOLUTE: 1.4 K/UL (ref 1–4.8)
LYMPHOCYTES RELATIVE PERCENT: 55 %
MCH RBC QN AUTO: 29.7 PG (ref 27–31.3)
MCHC RBC AUTO-ENTMCNC: 33.4 % (ref 33–37)
MCV RBC AUTO: 88.8 FL (ref 79.4–94.8)
MONOCYTES ABSOLUTE: 0.1 K/UL (ref 0.2–0.8)
MONOCYTES RELATIVE PERCENT: 5 %
NEUTROPHILS ABSOLUTE: 1 K/UL (ref 1.4–6.5)
NEUTROPHILS RELATIVE PERCENT: 39 %
PDW BLD-RTO: 14.4 % (ref 11.5–14.5)
PLATELET # BLD: 103 K/UL (ref 130–400)
PLATELET SLIDE REVIEW: ABNORMAL
POTASSIUM SERPL-SCNC: 4 MEQ/L (ref 3.4–4.9)
RBC # BLD: 3.65 M/UL (ref 4.2–5.4)
RBC # BLD: NORMAL 10*6/UL
SODIUM BLD-SCNC: 143 MEQ/L (ref 135–144)
WBC # BLD: 2.5 K/UL (ref 4.8–10.8)

## 2023-03-22 ENCOUNTER — OFFICE VISIT (OUTPATIENT)
Dept: GERIATRIC MEDICINE | Age: 88
End: 2023-03-22
Payer: COMMERCIAL

## 2023-03-22 DIAGNOSIS — M19.90 OSTEOARTHRITIS, UNSPECIFIED OSTEOARTHRITIS TYPE, UNSPECIFIED SITE: ICD-10-CM

## 2023-03-22 DIAGNOSIS — I10 PRIMARY HYPERTENSION: ICD-10-CM

## 2023-03-22 DIAGNOSIS — G20 PARKINSON'S DISEASE (HCC): Primary | ICD-10-CM

## 2023-03-22 PROCEDURE — 99308 SBSQ NF CARE LOW MDM 20: CPT | Performed by: NURSE PRACTITIONER

## 2023-03-22 PROCEDURE — 1123F ACP DISCUSS/DSCN MKR DOCD: CPT | Performed by: NURSE PRACTITIONER

## 2023-03-22 PROCEDURE — G8484 FLU IMMUNIZE NO ADMIN: HCPCS | Performed by: NURSE PRACTITIONER

## 2023-04-19 NOTE — PROGRESS NOTES
[facility]  3/22/2023    Chinedu Spivey  is a 80 y.o. in the NF being seen for a f/u of   Chief Complaint   Patient presents with    1 Month Follow-Up       HPI lives in [address] [place] [address]  Being seen monthly for chronic illnesses. No past medical history on file. No past surgical history on file. No family history on file. Social History     Socioeconomic History    Marital status:      Spouse name: Not on file    Number of children: Not on file    Years of education: Not on file    Highest education level: Not on file   Occupational History    Not on file   Tobacco Use    Smoking status: Not on file    Smokeless tobacco: Not on file   Substance and Sexual Activity    Alcohol use: Not on file    Drug use: Not on file    Sexual activity: Not on file   Other Topics Concern    Not on file   Social History Narrative    Not on file     Social Determinants of Health     Financial Resource Strain: Not on file   Food Insecurity: Not on file   Transportation Needs: Not on file   Physical Activity: Not on file   Stress: Not on file   Social Connections: Not on file   Intimate Partner Violence: Not on file   Housing Stability: Not on file       Allergies: Patient has no known allergies. NF MEDICATIONS REVIEWED    ROS:  See HPI  Constitutional: There are no reports of behavioral issues, change in appetite, fever, or weakness. No gross bleeding issues. Respiratory: denies SOB, dyspnea, dyspnea on exertion,   Cardiovascular: denies CP, lightheadedness,   GI: No reports of change of bowel habits, no N/V/D/C. : no reports of change in bladder habits  Extremities: No reports of pain issues, no edema    Physical exam:   Constitutional: Alert, elderly female, appears stated age, sitting in recliner, in no apparent distress. HEENT: normocephalic, very [place], MMM, no cyanosis.  NO neck mass visualized   Cardiovascular: Regular rate  Respiratory: unlabored respirations, no accessory muscle use noted  GI: abdomen

## 2023-05-02 ENCOUNTER — OFFICE VISIT (OUTPATIENT)
Dept: GERIATRIC MEDICINE | Age: 88
End: 2023-05-02
Payer: COMMERCIAL

## 2023-05-02 DIAGNOSIS — I10 PRIMARY HYPERTENSION: ICD-10-CM

## 2023-05-02 DIAGNOSIS — G20 PARKINSON'S DISEASE (HCC): Primary | ICD-10-CM

## 2023-05-02 DIAGNOSIS — M19.90 OSTEOARTHRITIS, UNSPECIFIED OSTEOARTHRITIS TYPE, UNSPECIFIED SITE: ICD-10-CM

## 2023-05-02 PROCEDURE — 1123F ACP DISCUSS/DSCN MKR DOCD: CPT | Performed by: NURSE PRACTITIONER

## 2023-05-02 PROCEDURE — 99308 SBSQ NF CARE LOW MDM 20: CPT | Performed by: NURSE PRACTITIONER

## 2023-05-03 NOTE — PROGRESS NOTES
[facility]  5/2/2023    Lory Cronin  is a 80 y.o. in the NF being seen for a f/u of   Chief Complaint   Patient presents with    1 Month Follow-Up       HPI lives in [address] [place] [address]  Being seen monthly for chronic illnesses. No past medical history on file. No past surgical history on file. No family history on file. Social History     Socioeconomic History    Marital status:      Spouse name: Not on file    Number of children: Not on file    Years of education: Not on file    Highest education level: Not on file   Occupational History    Not on file   Tobacco Use    Smoking status: Not on file    Smokeless tobacco: Not on file   Substance and Sexual Activity    Alcohol use: Not on file    Drug use: Not on file    Sexual activity: Not on file   Other Topics Concern    Not on file   Social History Narrative    Not on file     Social Determinants of Health     Financial Resource Strain: Not on file   Food Insecurity: Not on file   Transportation Needs: Not on file   Physical Activity: Not on file   Stress: Not on file   Social Connections: Not on file   Intimate Partner Violence: Not on file   Housing Stability: Not on file       Allergies: Patient has no known allergies. NF MEDICATIONS REVIEWED    ROS:  See HPI  Constitutional: There are no reports of behavioral issues, change in appetite, fever, or weakness. No gross bleeding issues. Respiratory: denies SOB, dyspnea, dyspnea on exertion,   Cardiovascular: denies CP, lightheadedness,   GI: No reports of change of bowel habits, no N/V/D/C. : no reports of change in bladder habits  Extremities: No reports of pain issues, no edema    Physical exam:  Constitutional: Sleeping but arousable, elderly female, appears stated age, in no apparent distress  HEENT: normocephalic, [place], MMM, no cyanosis.  NO neck mass visualized   Cardiovascular: Regular rate  Respiratory: unlabored respirations, no accessory muscle use noted, O2 @1L via NC.  GI:

## 2023-05-04 LAB
BASOPHILS # BLD: 0 K/UL (ref 0–0.2)
BASOPHILS NFR BLD: 0 %
EOSINOPHIL # BLD: 0 K/UL (ref 0–0.7)
EOSINOPHIL NFR BLD: 0 %
ERYTHROCYTE [DISTWIDTH] IN BLOOD BY AUTOMATED COUNT: 13.6 % (ref 11.5–14.5)
HCT VFR BLD AUTO: 35.9 % (ref 37–47)
HGB BLD-MCNC: 11.8 G/DL (ref 12–16)
LYMPHOCYTES # BLD: 1.4 K/UL (ref 1–4.8)
LYMPHOCYTES NFR BLD: 37 %
MCH RBC QN AUTO: 30.1 PG (ref 27–31.3)
MCHC RBC AUTO-ENTMCNC: 32.9 % (ref 33–37)
MCV RBC AUTO: 91.4 FL (ref 79.4–94.8)
MONOCYTES # BLD: 0.1 K/UL (ref 0.2–0.8)
MONOCYTES NFR BLD: 4 %
NEUTROPHILS # BLD: 1.2 K/UL (ref 1.4–6.5)
NEUTS BAND NFR BLD MANUAL: 2 % (ref 5–11)
NEUTS SEG NFR BLD: 43 %
PATH INTERP BLD-IMP: YES
PLATELET # BLD AUTO: 101 K/UL (ref 130–400)
PLATELET BLD QL SMEAR: ADEQUATE
RBC # BLD AUTO: 3.92 M/UL (ref 4.2–5.4)
SLIDE REVIEW: ABNORMAL
VARIANT LYMPHS NFR BLD: 14 %
WBC # BLD AUTO: 2.7 K/UL (ref 4.8–10.8)

## 2023-05-05 LAB — PATH INTERP BLD-IMP: NORMAL

## 2023-06-08 ENCOUNTER — OFFICE VISIT (OUTPATIENT)
Dept: GERIATRIC MEDICINE | Age: 88
End: 2023-06-08

## 2023-06-08 DIAGNOSIS — G20 PARKINSON'S DISEASE (HCC): ICD-10-CM

## 2023-06-08 DIAGNOSIS — E11.9 TYPE 2 DIABETES MELLITUS WITHOUT COMPLICATION, UNSPECIFIED WHETHER LONG TERM INSULIN USE (HCC): Primary | ICD-10-CM

## 2023-06-08 DIAGNOSIS — I10 PRIMARY HYPERTENSION: ICD-10-CM

## 2023-07-07 NOTE — PROGRESS NOTES
SUBJECTIVE:        ROS:  The rest of the 14 point ROS negative    PHYSICAL EXAM: VSS per facility record      ASSESSMENT & PLAN:   Diagnosis Orders   1. Type 2 diabetes mellitus without complication, unspecified whether long term insulin use ([facility])        2. Primary hypertension        3. Parkinson's disease ([facility])                      No past medical history on file. No past surgical history on file. Current Outpatient Medications on File Prior to Visit   Medication Sig Dispense Refill    albuterol sulfate HFA (PROVENTIL HFA) 108 (90 Base) MCG/ACT inhaler Inhale 1 puff into the lungs every 2 hours as needed for Wheezing or Shortness of Breath 1 Inhaler 3    albuterol sulfate HFA (PROVENTIL HFA) 108 (90 Base) MCG/ACT inhaler Inhale 1 puff into the lungs every 4 hours 1 Inhaler 3     No current facility-administered medications on file prior to visit. No family history on file.     Social History     Socioeconomic History    Marital status:      Spouse name: Not on file    Number of children: Not on file    Years of education: Not on file    Highest education level: Not on file   Occupational History    Not on file   Tobacco Use    Smoking status: Not on file    Smokeless tobacco: Not on file   Substance and Sexual Activity    Alcohol use: Not on file    Drug use: Not on file    Sexual activity: Not on file   Other Topics Concern    Not on file   Social History Narrative    Not on file     Social Determinants of Health     Financial Resource Strain: Not on file   Food Insecurity: Not on file   Transportation Needs: Not on file   Physical Activity: Not on file   Stress: Not on file   Social Connections: Not on file   Intimate Partner Violence: Not on file   Housing Stability: Not on file         Lab Results   Component Value Date    LABA1C 5.6 08/31/2022     No results found for: EAG    Lab Results   Component Value Date/Time     03/10/2023 06:39 AM    K 4.0 03/10/2023 06:39 AM

## 2023-07-21 ENCOUNTER — OFFICE VISIT (OUTPATIENT)
Dept: GERIATRIC MEDICINE | Age: 88
End: 2023-07-21
Payer: COMMERCIAL

## 2023-07-21 DIAGNOSIS — G20 PARKINSON'S DISEASE (HCC): Primary | ICD-10-CM

## 2023-07-21 DIAGNOSIS — M19.90 OSTEOARTHRITIS, UNSPECIFIED OSTEOARTHRITIS TYPE, UNSPECIFIED SITE: ICD-10-CM

## 2023-07-21 DIAGNOSIS — I10 PRIMARY HYPERTENSION: ICD-10-CM

## 2023-07-21 PROCEDURE — 1123F ACP DISCUSS/DSCN MKR DOCD: CPT | Performed by: NURSE PRACTITIONER

## 2023-07-21 PROCEDURE — 99308 SBSQ NF CARE LOW MDM 20: CPT | Performed by: NURSE PRACTITIONER

## 2023-07-26 NOTE — PROGRESS NOTES
[facility]  7/21/2023    Christel Yañez  is a 80 y.o. in the NF being seen for a f/u of   Chief Complaint   Patient presents with    1 Month Follow-Up       HPI lives in [address] [place] [address]  Being seen monthly for chronic illnesses. No past medical history on file. No past surgical history on file. No family history on file. Social History     Socioeconomic History    Marital status:      Spouse name: Not on file    Number of children: Not on file    Years of education: Not on file    Highest education level: Not on file   Occupational History    Not on file   Tobacco Use    Smoking status: Not on file    Smokeless tobacco: Not on file   Substance and Sexual Activity    Alcohol use: Not on file    Drug use: Not on file    Sexual activity: Not on file   Other Topics Concern    Not on file   Social History Narrative    Not on file     Social Determinants of Health     Financial Resource Strain: Not on file   Food Insecurity: Not on file   Transportation Needs: Not on file   Physical Activity: Not on file   Stress: Not on file   Social Connections: Not on file   Intimate Partner Violence: Not on file   Housing Stability: Not on file       Allergies: Patient has no known allergies. NF MEDICATIONS REVIEWED    ROS:  See HPI  Constitutional: There are no reports of behavioral issues, change in appetite, fever, or weakness. No gross bleeding issues. Respiratory: denies SOB, dyspnea, dyspnea on exertion,   Cardiovascular: denies CP, lightheadedness,   GI: No reports of change of bowel habits, no N/V/D/C. : no reports of change in bladder habits  Extremities: No reports of pain issues, no edema    Physical exam:   Constitutional: Sleeping but arousable, elderly female, appears stated age, sitting in recliner, in no apparent distress. HEENT: normocephalic, [place], MMM, no cyanosis.  NO neck mass visualized   Cardiovascular: Regular rate  Respiratory: unlabored respirations, no accessory muscle use

## 2023-09-19 ENCOUNTER — OFFICE VISIT (OUTPATIENT)
Dept: GERIATRIC MEDICINE | Age: 88
End: 2023-09-19

## 2023-09-19 DIAGNOSIS — I10 PRIMARY HYPERTENSION: ICD-10-CM

## 2023-09-19 DIAGNOSIS — G47.00 INSOMNIA, UNSPECIFIED TYPE: Primary | ICD-10-CM

## 2023-09-19 DIAGNOSIS — D69.6 THROMBOCYTOPENIA (HCC): ICD-10-CM

## 2023-10-19 ENCOUNTER — OFFICE VISIT (OUTPATIENT)
Dept: GERIATRIC MEDICINE | Age: 88
End: 2023-10-19

## 2023-10-19 DIAGNOSIS — E11.9 TYPE 2 DIABETES MELLITUS WITHOUT COMPLICATION, UNSPECIFIED WHETHER LONG TERM INSULIN USE (HCC): ICD-10-CM

## 2023-10-19 DIAGNOSIS — G20.A1 PARKINSON'S DISEASE, UNSPECIFIED WHETHER DYSKINESIA PRESENT, UNSPECIFIED WHETHER MANIFESTATIONS FLUCTUATE: Primary | ICD-10-CM

## 2023-10-19 DIAGNOSIS — I10 PRIMARY HYPERTENSION: ICD-10-CM

## 2023-11-16 NOTE — PROGRESS NOTES
SUBJECTIVE:  This 42-year-old woman seen follows for Parkinson's hypertension diabetes patient globally weak at this time without acute pain crisis no acute fluctuations in her Parkinson's no acute flare no lightheadedness no emesis fever chills      ROS: Limited by cognition  The rest of the 14 point ROS negative    PHYSICAL EXAM: VSS per facility record  Alert to self pupils reactive oral mucosa moist chest no crackles or wheezing cardiovascular showed a regular abdomen soft nontender extremity trace edema    ASSESSMENT & PLAN:   Diagnosis Orders   1. Parkinson's disease, unspecified whether dyskinesia present, unspecified whether manifestations fluctuate        2. Primary hypertension        3. Type 2 diabetes mellitus without complication, unspecified whether long term insulin use (HCC)          Continue anti-inflammatory agents has been on Sinemet notes about hypoglycemia BMP pending. Continue supportive care            No past medical history on file. No past surgical history on file. Current Outpatient Medications on File Prior to Visit   Medication Sig Dispense Refill    albuterol sulfate HFA (PROVENTIL HFA) 108 (90 Base) MCG/ACT inhaler Inhale 1 puff into the lungs every 2 hours as needed for Wheezing or Shortness of Breath 1 Inhaler 3    albuterol sulfate HFA (PROVENTIL HFA) 108 (90 Base) MCG/ACT inhaler Inhale 1 puff into the lungs every 4 hours 1 Inhaler 3     No current facility-administered medications on file prior to visit. No family history on file.     Social History     Socioeconomic History    Marital status:      Spouse name: Not on file    Number of children: Not on file    Years of education: Not on file    Highest education level: Not on file   Occupational History    Not on file   Tobacco Use    Smoking status: Not on file    Smokeless tobacco: Not on file   Substance and Sexual Activity    Alcohol use: Not on file    Drug use: Not on file    Sexual activity: Not on

## 2023-11-20 ENCOUNTER — OFFICE VISIT (OUTPATIENT)
Dept: GERIATRIC MEDICINE | Age: 88
End: 2023-11-20

## 2023-11-20 DIAGNOSIS — M19.90 OSTEOARTHRITIS, UNSPECIFIED OSTEOARTHRITIS TYPE, UNSPECIFIED SITE: Primary | ICD-10-CM

## 2023-11-20 DIAGNOSIS — F32.9 MAJOR DEPRESSIVE DISORDER, REMISSION STATUS UNSPECIFIED, UNSPECIFIED WHETHER RECURRENT: ICD-10-CM

## 2023-11-20 DIAGNOSIS — I10 PRIMARY HYPERTENSION: ICD-10-CM

## 2023-12-08 NOTE — PROGRESS NOTES
[facility]  11/20/2023    Naheed Wolfe  is a 80 y.o. in the NF being seen for a f/u of   Chief Complaint   Patient presents with    1 Month Follow-Up       HPI lives in [address] [place] [address]  Being seen monthly for chronic illnesses. No past medical history on file. No past surgical history on file. No family history on file. Social History     Socioeconomic History    Marital status:      Spouse name: Not on file    Number of children: Not on file    Years of education: Not on file    Highest education level: Not on file   Occupational History    Not on file   Tobacco Use    Smoking status: Not on file    Smokeless tobacco: Not on file   Substance and Sexual Activity    Alcohol use: Not on file    Drug use: Not on file    Sexual activity: Not on file   Other Topics Concern    Not on file   Social History Narrative    Not on file     Social Determinants of Health     Financial Resource Strain: Not on file   Food Insecurity: Not on file   Transportation Needs: Not on file   Physical Activity: Not on file   Stress: Not on file   Social Connections: Not on file   Intimate Partner Violence: Not on file   Housing Stability: Not on file       Allergies: Patient has no known allergies. NF MEDICATIONS REVIEWED    ROS:  See HPI  Constitutional: There are no reports of behavioral issues, change in appetite, fever, or weakness. No gross bleeding issues. Respiratory: denies SOB, dyspnea, dyspnea on exertion,   Cardiovascular: denies CP, lightheadedness,   GI: No reports of change of bowel habits, no N/V/D/C. : no reports of change in bladder habits  Extremities: No reports of pain issues, no edema    Physical exam:   Constitutional: Frail, elderly, sleeping but arousable female, sitting in recliner, appears stated age, in no apparent distress. HEENT: normocephalic, [place], MMM, no cyanosis.  NO neck mass visualized   Cardiovascular: Regular rate  Respiratory: unlabored respirations, no accessory muscle use

## 2024-01-01 ENCOUNTER — CLINICAL SUPPORT (OUTPATIENT)
Dept: OTHER | Facility: HOSPITAL | Age: 89
End: 2024-01-01
Payer: COMMERCIAL

## 2024-01-01 ENCOUNTER — HOSPITAL ENCOUNTER (EMERGENCY)
Dept: CARDIOLOGY | Facility: HOSPITAL | Age: 89
Discharge: HOME | DRG: 314 | End: 2024-05-28
Payer: COMMERCIAL

## 2024-01-01 ENCOUNTER — HOSPITAL ENCOUNTER (EMERGENCY)
Facility: HOSPITAL | Age: 89
DRG: 314 | End: 2024-05-28
Attending: STUDENT IN AN ORGANIZED HEALTH CARE EDUCATION/TRAINING PROGRAM | Admitting: INTERNAL MEDICINE
Payer: COMMERCIAL

## 2024-01-01 ENCOUNTER — APPOINTMENT (OUTPATIENT)
Dept: RADIOLOGY | Facility: HOSPITAL | Age: 89
DRG: 314 | End: 2024-01-01
Payer: COMMERCIAL

## 2024-01-01 VITALS
HEIGHT: 58 IN | HEART RATE: 54 BPM | TEMPERATURE: 97.3 F | BODY MASS INDEX: 20.99 KG/M2 | SYSTOLIC BLOOD PRESSURE: 53 MMHG | OXYGEN SATURATION: 80 % | RESPIRATION RATE: 12 BRPM | WEIGHT: 100 LBS | DIASTOLIC BLOOD PRESSURE: 40 MMHG

## 2024-01-01 DIAGNOSIS — R41.89 UNRESPONSIVE: Primary | ICD-10-CM

## 2024-01-01 LAB
ALBUMIN SERPL BCP-MCNC: 3.4 G/DL (ref 3.4–5)
ALP SERPL-CCNC: 59 U/L (ref 33–136)
ALT SERPL W P-5'-P-CCNC: <3 U/L (ref 7–45)
ANION GAP BLDA CALCULATED.4IONS-SCNC: 6 MMO/L (ref 10–25)
ANION GAP SERPL CALC-SCNC: 14 MMOL/L (ref 10–20)
APPARATUS: ABNORMAL
AST SERPL W P-5'-P-CCNC: 10 U/L (ref 9–39)
ATRIAL RATE: 89 BPM
BASE EXCESS BLDA CALC-SCNC: 5.6 MMOL/L (ref -2–3)
BASOPHILS # BLD MANUAL: 0 X10*3/UL (ref 0–0.1)
BASOPHILS NFR BLD MANUAL: 0 %
BILIRUB SERPL-MCNC: 0.6 MG/DL (ref 0–1.2)
BNP SERPL-MCNC: 907 PG/ML (ref 0–99)
BODY TEMPERATURE: ABNORMAL
BUN SERPL-MCNC: 59 MG/DL (ref 6–23)
BURR CELLS BLD QL SMEAR: ABNORMAL
CA-I BLDA-SCNC: 1.31 MMOL/L (ref 1.1–1.33)
CALCIUM SERPL-MCNC: 9.8 MG/DL (ref 8.6–10.3)
CARDIAC TROPONIN I PNL SERPL HS: 68 NG/L (ref 0–13)
CARDIAC TROPONIN I PNL SERPL HS: 88 NG/L (ref 0–13)
CHLORIDE BLDA-SCNC: 109 MMOL/L (ref 98–107)
CHLORIDE SERPL-SCNC: 107 MMOL/L (ref 98–107)
CO2 SERPL-SCNC: 30 MMOL/L (ref 21–32)
CREAT SERPL-MCNC: 1.57 MG/DL (ref 0.5–1.05)
EGFRCR SERPLBLD CKD-EPI 2021: 30 ML/MIN/1.73M*2
EOSINOPHIL # BLD MANUAL: 0 X10*3/UL (ref 0–0.4)
EOSINOPHIL NFR BLD MANUAL: 0 %
ERYTHROCYTE [DISTWIDTH] IN BLOOD BY AUTOMATED COUNT: 14.3 % (ref 11.5–14.5)
GLUCOSE BLD MANUAL STRIP-MCNC: 202 MG/DL (ref 74–99)
GLUCOSE BLDA-MCNC: 228 MG/DL (ref 74–99)
GLUCOSE SERPL-MCNC: 206 MG/DL (ref 74–99)
HCO3 BLDA-SCNC: 33.5 MMOL/L (ref 22–26)
HCT VFR BLD AUTO: 38.4 % (ref 36–46)
HCT VFR BLD EST: 36 % (ref 36–46)
HGB BLD-MCNC: 12.1 G/DL (ref 12–16)
HGB BLDA-MCNC: 12 G/DL (ref 12–16)
IMM GRANULOCYTES # BLD AUTO: 0.19 X10*3/UL (ref 0–0.5)
IMM GRANULOCYTES NFR BLD AUTO: 2.1 % (ref 0–0.9)
INHALED O2 CONCENTRATION: 100 %
INR PPP: 1.1 (ref 0.9–1.1)
LACTATE BLDA-SCNC: 2.8 MMOL/L (ref 0.4–2)
LACTATE SERPL-SCNC: 2.5 MMOL/L (ref 0.4–2)
LIPASE SERPL-CCNC: <3 U/L (ref 9–82)
LYMPHOCYTES # BLD MANUAL: 0.8 X10*3/UL (ref 0.8–3)
LYMPHOCYTES NFR BLD MANUAL: 9 %
MAGNESIUM SERPL-MCNC: 2.48 MG/DL (ref 1.6–2.4)
MCH RBC QN AUTO: 30.1 PG (ref 26–34)
MCHC RBC AUTO-ENTMCNC: 31.5 G/DL (ref 32–36)
MCV RBC AUTO: 96 FL (ref 80–100)
METAMYELOCYTES # BLD MANUAL: 0.8 X10*3/UL
METAMYELOCYTES NFR BLD MANUAL: 9 %
MONOCYTES # BLD MANUAL: 1.07 X10*3/UL (ref 0.05–0.8)
MONOCYTES NFR BLD MANUAL: 12 %
MYELOCYTES # BLD MANUAL: 0.09 X10*3/UL
MYELOCYTES NFR BLD MANUAL: 1 %
NEUTROPHILS # BLD MANUAL: 6.14 X10*3/UL (ref 1.6–5.5)
NEUTS BAND # BLD MANUAL: 2.31 X10*3/UL (ref 0–0.5)
NEUTS BAND NFR BLD MANUAL: 26 %
NEUTS SEG # BLD MANUAL: 3.83 X10*3/UL (ref 1.6–5)
NEUTS SEG NFR BLD MANUAL: 43 %
NRBC BLD-RTO: 0 /100 WBCS (ref 0–0)
OVALOCYTES BLD QL SMEAR: ABNORMAL
OXYHGB MFR BLDA: 91.3 % (ref 94–98)
P AXIS: 26 DEGREES
P OFFSET: 141 MS
P ONSET: 78 MS
PCO2 BLDA: 65 MM HG (ref 38–42)
PH BLDA: 7.32 PH (ref 7.38–7.42)
PLATELET # BLD AUTO: 173 X10*3/UL (ref 150–450)
PO2 BLDA: 66 MM HG (ref 85–95)
POTASSIUM BLDA-SCNC: 5.2 MMOL/L (ref 3.5–5.3)
POTASSIUM SERPL-SCNC: 5.1 MMOL/L (ref 3.5–5.3)
PR INTERVAL: 272 MS
PROT SERPL-MCNC: 7.4 G/DL (ref 6.4–8.2)
PROTHROMBIN TIME: 12.3 SECONDS (ref 9.8–12.8)
Q ONSET: 214 MS
QRS COUNT: 15 BEATS
QRS DURATION: 114 MS
QT INTERVAL: 328 MS
QTC CALCULATION(BAZETT): 399 MS
QTC FREDERICIA: 373 MS
R AXIS: -38 DEGREES
RBC # BLD AUTO: 4.02 X10*6/UL (ref 4–5.2)
RBC MORPH BLD: ABNORMAL
SAO2 % BLDA: 93 % (ref 94–100)
SARS-COV-2 RNA RESP QL NAA+PROBE: NOT DETECTED
SODIUM BLDA-SCNC: 143 MMOL/L (ref 136–145)
SODIUM SERPL-SCNC: 146 MMOL/L (ref 136–145)
SPECIMEN DRAWN FROM PATIENT: ABNORMAL
T AXIS: 96 DEGREES
T OFFSET: 378 MS
TOTAL CELLS COUNTED BLD: 100
VENTRICULAR RATE: 89 BPM
WBC # BLD AUTO: 8.9 X10*3/UL (ref 4.4–11.3)

## 2024-01-01 PROCEDURE — 96375 TX/PRO/DX INJ NEW DRUG ADDON: CPT

## 2024-01-01 PROCEDURE — 36415 COLL VENOUS BLD VENIPUNCTURE: CPT | Performed by: STUDENT IN AN ORGANIZED HEALTH CARE EDUCATION/TRAINING PROGRAM

## 2024-01-01 PROCEDURE — 85610 PROTHROMBIN TIME: CPT | Performed by: STUDENT IN AN ORGANIZED HEALTH CARE EDUCATION/TRAINING PROGRAM

## 2024-01-01 PROCEDURE — 87040 BLOOD CULTURE FOR BACTERIA: CPT | Mod: 91,ELYLAB | Performed by: STUDENT IN AN ORGANIZED HEALTH CARE EDUCATION/TRAINING PROGRAM

## 2024-01-01 PROCEDURE — 1210000001 HC SEMI-PRIVATE ROOM DAILY

## 2024-01-01 PROCEDURE — 83690 ASSAY OF LIPASE: CPT | Performed by: STUDENT IN AN ORGANIZED HEALTH CARE EDUCATION/TRAINING PROGRAM

## 2024-01-01 PROCEDURE — 96367 TX/PROPH/DG ADDL SEQ IV INF: CPT

## 2024-01-01 PROCEDURE — 83880 ASSAY OF NATRIURETIC PEPTIDE: CPT | Performed by: STUDENT IN AN ORGANIZED HEALTH CARE EDUCATION/TRAINING PROGRAM

## 2024-01-01 PROCEDURE — 2500000005 HC RX 250 GENERAL PHARMACY W/O HCPCS: Performed by: STUDENT IN AN ORGANIZED HEALTH CARE EDUCATION/TRAINING PROGRAM

## 2024-01-01 PROCEDURE — 87185 SC STD ENZYME DETCJ PER NZM: CPT | Mod: 91,ELYLAB | Performed by: STUDENT IN AN ORGANIZED HEALTH CARE EDUCATION/TRAINING PROGRAM

## 2024-01-01 PROCEDURE — 71045 X-RAY EXAM CHEST 1 VIEW: CPT | Performed by: RADIOLOGY

## 2024-01-01 PROCEDURE — 84484 ASSAY OF TROPONIN QUANT: CPT | Performed by: STUDENT IN AN ORGANIZED HEALTH CARE EDUCATION/TRAINING PROGRAM

## 2024-01-01 PROCEDURE — 71045 X-RAY EXAM CHEST 1 VIEW: CPT

## 2024-01-01 PROCEDURE — 83735 ASSAY OF MAGNESIUM: CPT | Performed by: STUDENT IN AN ORGANIZED HEALTH CARE EDUCATION/TRAINING PROGRAM

## 2024-01-01 PROCEDURE — 85018 HEMOGLOBIN: CPT | Mod: 91 | Performed by: STUDENT IN AN ORGANIZED HEALTH CARE EDUCATION/TRAINING PROGRAM

## 2024-01-01 PROCEDURE — 82947 ASSAY GLUCOSE BLOOD QUANT: CPT | Mod: 59

## 2024-01-01 PROCEDURE — 99291 CRITICAL CARE FIRST HOUR: CPT | Mod: CS | Performed by: STUDENT IN AN ORGANIZED HEALTH CARE EDUCATION/TRAINING PROGRAM

## 2024-01-01 PROCEDURE — 36600 WITHDRAWAL OF ARTERIAL BLOOD: CPT

## 2024-01-01 PROCEDURE — 85007 BL SMEAR W/DIFF WBC COUNT: CPT | Performed by: STUDENT IN AN ORGANIZED HEALTH CARE EDUCATION/TRAINING PROGRAM

## 2024-01-01 PROCEDURE — 87635 SARS-COV-2 COVID-19 AMP PRB: CPT | Performed by: STUDENT IN AN ORGANIZED HEALTH CARE EDUCATION/TRAINING PROGRAM

## 2024-01-01 PROCEDURE — 96376 TX/PRO/DX INJ SAME DRUG ADON: CPT

## 2024-01-01 PROCEDURE — 2500000004 HC RX 250 GENERAL PHARMACY W/ HCPCS (ALT 636 FOR OP/ED): Performed by: STUDENT IN AN ORGANIZED HEALTH CARE EDUCATION/TRAINING PROGRAM

## 2024-01-01 PROCEDURE — 80053 COMPREHEN METABOLIC PANEL: CPT | Performed by: STUDENT IN AN ORGANIZED HEALTH CARE EDUCATION/TRAINING PROGRAM

## 2024-01-01 PROCEDURE — 87075 CULTR BACTERIA EXCEPT BLOOD: CPT | Mod: 91,ELYLAB | Performed by: STUDENT IN AN ORGANIZED HEALTH CARE EDUCATION/TRAINING PROGRAM

## 2024-01-01 PROCEDURE — 83605 ASSAY OF LACTIC ACID: CPT | Performed by: STUDENT IN AN ORGANIZED HEALTH CARE EDUCATION/TRAINING PROGRAM

## 2024-01-01 PROCEDURE — 96365 THER/PROPH/DIAG IV INF INIT: CPT

## 2024-01-01 PROCEDURE — 93005 ELECTROCARDIOGRAM TRACING: CPT

## 2024-01-01 PROCEDURE — 82435 ASSAY OF BLOOD CHLORIDE: CPT | Performed by: STUDENT IN AN ORGANIZED HEALTH CARE EDUCATION/TRAINING PROGRAM

## 2024-01-01 PROCEDURE — 85027 COMPLETE CBC AUTOMATED: CPT | Performed by: STUDENT IN AN ORGANIZED HEALTH CARE EDUCATION/TRAINING PROGRAM

## 2024-01-01 RX ORDER — ACETAMINOPHEN 325 MG/1
650 TABLET ORAL 2 TIMES DAILY
COMMUNITY

## 2024-01-01 RX ORDER — TRAZODONE HYDROCHLORIDE 50 MG/1
0.5 TABLET ORAL NIGHTLY
COMMUNITY

## 2024-01-01 RX ORDER — METOPROLOL TARTRATE 25 MG/1
0.5 TABLET, FILM COATED ORAL 2 TIMES DAILY
COMMUNITY

## 2024-01-01 RX ORDER — POLYETHYLENE GLYCOL 3350 17 G/17G
17 POWDER, FOR SOLUTION ORAL DAILY
COMMUNITY

## 2024-01-01 RX ORDER — NOREPINEPHRINE BITARTRATE/D5W 8 MG/250ML
.01-.5 PLASTIC BAG, INJECTION (ML) INTRAVENOUS CONTINUOUS
Status: DISCONTINUED | OUTPATIENT
Start: 2024-01-01 | End: 2024-01-01

## 2024-01-01 RX ORDER — GUAIFENESIN 100 MG/5ML
10 SOLUTION ORAL EVERY 4 HOURS PRN
COMMUNITY

## 2024-01-01 RX ORDER — ASPIRIN 325 MG
50000 TABLET, DELAYED RELEASE (ENTERIC COATED) ORAL
COMMUNITY

## 2024-01-01 RX ORDER — ACETAMINOPHEN 325 MG/1
650 TABLET ORAL EVERY 4 HOURS PRN
COMMUNITY

## 2024-01-01 RX ORDER — MIRTAZAPINE 15 MG/1
15 TABLET, FILM COATED ORAL DAILY
COMMUNITY

## 2024-01-01 RX ORDER — CARBIDOPA AND LEVODOPA 25; 100 MG/1; MG/1
1 TABLET ORAL 4 TIMES DAILY
COMMUNITY

## 2024-01-01 RX ORDER — MELATONIN 3 MG
1 CAPSULE ORAL NIGHTLY
COMMUNITY

## 2024-01-01 RX ORDER — AMLODIPINE BESYLATE 5 MG/1
5 TABLET ORAL 2 TIMES DAILY
COMMUNITY

## 2024-01-01 RX ORDER — VANCOMYCIN HYDROCHLORIDE 1 G/200ML
1 INJECTION, SOLUTION INTRAVENOUS ONCE
Status: COMPLETED | OUTPATIENT
Start: 2024-01-01 | End: 2024-01-01

## 2024-01-01 RX ORDER — MORPHINE SULFATE 2 MG/ML
2 INJECTION, SOLUTION INTRAMUSCULAR; INTRAVENOUS
Status: COMPLETED | OUTPATIENT
Start: 2024-01-01 | End: 2024-01-01

## 2024-01-01 RX ORDER — DOCUSATE SODIUM 100 MG/1
100 CAPSULE, LIQUID FILLED ORAL DAILY
COMMUNITY

## 2024-01-01 RX ADMIN — Medication 15 L/MIN: at 13:20

## 2024-01-01 RX ADMIN — SODIUM CHLORIDE 1000 ML: 9 INJECTION, SOLUTION INTRAVENOUS at 13:29

## 2024-01-01 RX ADMIN — VANCOMYCIN HYDROCHLORIDE 1 G: 1 INJECTION, SOLUTION INTRAVENOUS at 14:21

## 2024-01-01 RX ADMIN — MORPHINE SULFATE 2 MG: 2 INJECTION, SOLUTION INTRAMUSCULAR; INTRAVENOUS at 16:32

## 2024-01-01 RX ADMIN — PIPERACILLIN AND TAZOBACTAM 4.5 G: 4; .5 INJECTION, POWDER, LYOPHILIZED, FOR SOLUTION INTRAVENOUS at 13:46

## 2024-01-01 RX ADMIN — MORPHINE SULFATE 2 MG: 2 INJECTION, SOLUTION INTRAMUSCULAR; INTRAVENOUS at 16:48

## 2024-01-01 RX ADMIN — MORPHINE SULFATE 2 MG: 2 INJECTION, SOLUTION INTRAMUSCULAR; INTRAVENOUS at 16:16

## 2024-01-01 ASSESSMENT — PAIN - FUNCTIONAL ASSESSMENT
PAIN_FUNCTIONAL_ASSESSMENT: WONG-BAKER FACES
PAIN_FUNCTIONAL_ASSESSMENT: UNABLE TO SELF-REPORT
PAIN_FUNCTIONAL_ASSESSMENT: WONG-BAKER FACES

## 2024-01-01 ASSESSMENT — COLUMBIA-SUICIDE SEVERITY RATING SCALE - C-SSRS
2. HAVE YOU ACTUALLY HAD ANY THOUGHTS OF KILLING YOURSELF?: NO
6. HAVE YOU EVER DONE ANYTHING, STARTED TO DO ANYTHING, OR PREPARED TO DO ANYTHING TO END YOUR LIFE?: NO
1. IN THE PAST MONTH, HAVE YOU WISHED YOU WERE DEAD OR WISHED YOU COULD GO TO SLEEP AND NOT WAKE UP?: NO

## 2024-01-01 ASSESSMENT — PAIN SCALES - GENERAL
PAINLEVEL_OUTOF10: 4
PAINLEVEL_OUTOF10: 6

## 2024-01-15 ENCOUNTER — OFFICE VISIT (OUTPATIENT)
Dept: GERIATRIC MEDICINE | Age: 89
End: 2024-01-15

## 2024-01-15 DIAGNOSIS — G20.A1 PARKINSON'S DISEASE, UNSPECIFIED WHETHER DYSKINESIA PRESENT, UNSPECIFIED WHETHER MANIFESTATIONS FLUCTUATE: Primary | ICD-10-CM

## 2024-01-15 DIAGNOSIS — I10 PRIMARY HYPERTENSION: ICD-10-CM

## 2024-01-15 DIAGNOSIS — M19.90 OSTEOARTHRITIS, UNSPECIFIED OSTEOARTHRITIS TYPE, UNSPECIFIED SITE: ICD-10-CM

## 2024-01-19 LAB — RSV BY PCR: NEGATIVE

## 2024-02-03 NOTE — PROGRESS NOTES
[facility]  1/15/2024    Antonio Rowell  is a 89 y.o. in the NF being seen for a f/u of   Chief Complaint   Patient presents with    1 Month Follow-Up       HPI lives in LTC  Being seen monthly for chronic illnesses.     No past medical history on file.  No past surgical history on file.  No family history on file.  Social History     Socioeconomic History    Marital status:      Spouse name: Not on file    Number of children: Not on file    Years of education: Not on file    Highest education level: Not on file   Occupational History    Not on file   Tobacco Use    Smoking status: Not on file    Smokeless tobacco: Not on file   Substance and Sexual Activity    Alcohol use: Not on file    Drug use: Not on file    Sexual activity: Not on file   Other Topics Concern    Not on file   Social History Narrative    Not on file     Social Determinants of Health     Financial Resource Strain: Not on file   Food Insecurity: Not on file   Transportation Needs: Not on file   Physical Activity: Not on file   Stress: Not on file   Social Connections: Not on file   Intimate Partner Violence: Not on file   Housing Stability: Not on file       Allergies: Patient has no known allergies.  NF MEDICATIONS REVIEWED    ROS:  See HPI  Constitutional: There are no reports of behavioral issues, change in appetite, fever, or weakness. No gross bleeding issues.  Respiratory: denies SOB, dyspnea, dyspnea on exertion,   Cardiovascular: denies CP, lightheadedness,   GI: No reports of change of bowel habits, no N/V/D/C.   : no reports of change in bladder habits  Extremities: No reports of pain issues, no edema    Physical exam:   /76, temperature 97.4, heart rate 72, respirations 19, spo2 97%, weight 86lbs.  Constitutional: Alert, elderly, frail female,sitting in recliner in no apparent distress  HEENT: normocephalic, very [place], MMM, no cyanosis. NO neck mass visualized   Cardiovascular: Regular

## 2024-03-13 ENCOUNTER — OFFICE VISIT (OUTPATIENT)
Dept: GERIATRIC MEDICINE | Age: 89
End: 2024-03-13

## 2024-03-13 DIAGNOSIS — R54 FRAILTY SYNDROME IN GERIATRIC PATIENT: ICD-10-CM

## 2024-03-13 DIAGNOSIS — G20.A1 PARKINSON'S DISEASE, UNSPECIFIED WHETHER DYSKINESIA PRESENT, UNSPECIFIED WHETHER MANIFESTATIONS FLUCTUATE (HCC): Primary | ICD-10-CM

## 2024-03-15 ENCOUNTER — OFFICE VISIT (OUTPATIENT)
Dept: GERIATRIC MEDICINE | Age: 89
End: 2024-03-15

## 2024-03-15 DIAGNOSIS — G20.A1 PARKINSON'S DISEASE, UNSPECIFIED WHETHER DYSKINESIA PRESENT, UNSPECIFIED WHETHER MANIFESTATIONS FLUCTUATE (HCC): Primary | ICD-10-CM

## 2024-03-15 DIAGNOSIS — M19.90 OSTEOARTHRITIS, UNSPECIFIED OSTEOARTHRITIS TYPE, UNSPECIFIED SITE: ICD-10-CM

## 2024-03-15 DIAGNOSIS — J40 BRONCHITIS: ICD-10-CM

## 2024-03-25 NOTE — PROGRESS NOTES
GERIATRIC:  FRAILTY SYNDROME ASSESSMENT    Antonio Rowell was a 89 y.o. female seen today in the nursing home setting for a comprehensive Geriatric assessment for evaluation of potential Frailty Syndrome.  This patient was evaluated on site after evaluation of nursing home record and discussion with nursing staff.  Weight loss trackers reviewed for potential weight loss and interventions. Nutritional intake reviewed.   Skin surveillance records reviewed and potential areas of concern discussed with nursing staff.  Cognitive assessments and depression screening reviewed. Reviewed potential instability and unsteadiness with nursing and reviewed therapy records.     Weight Loss: Yes  Clinical Signs of Malnutrition: Yes  Clinical Sarcopenia:Yes  Skin Breakdown: No  Polypharmacy: No  Dysphagia: No  Falls within 6 months: Yes   Cognitive Impairment: Yes  Ambulatory: No  Known Osteoporosis/Osteopenia: No  Currently on Appetite Stimulant: Yes  Multiple Comorbidites: Yes    Lab Results   Component Value Date    LABA1C 5.6 08/31/2022     No results found for: \"EAG\"  Lab Results   Component Value Date/Time     03/10/2023 06:39 AM    K 4.0 03/10/2023 06:39 AM     03/10/2023 06:39 AM    CO2 29 03/10/2023 06:39 AM    BUN 17 03/10/2023 06:39 AM    CREATININE 0.52 03/10/2023 06:39 AM    GLUCOSE 80 03/10/2023 06:39 AM    GLUCOSE 112 06/05/2012 07:50 AM    CALCIUM 9.1 03/10/2023 06:39 AM      Lab Results   Component Value Date    CHOL 118 03/15/2018    CHOL 191 08/08/2013     Lab Results   Component Value Date    TRIG 59 03/15/2018    TRIG 106 08/08/2013     Lab Results   Component Value Date    HDL 46 03/15/2018    HDL 67 (H) 08/08/2013     Lab Results   Component Value Date    LDLCALC 60 03/15/2018    LDLCALC 103 08/08/2013     No results found for: \"VLDL\"  No results found for: \"CHOLHDLRATIO\"  Lab Results   Component Value Date    TSH 0.581 06/21/2022     Lab Results   Component Value Date    WBC 2.7 (L) 05/04/2023

## 2024-03-29 NOTE — PROGRESS NOTES
[facility]  3/15/2024    Antonio Rowell  is a 89 y.o. in the NF being seen for a f/u of   Chief Complaint   Patient presents with    1 Month Follow-Up       HPI lives in LTC  Being seen monthly for chronic illnesses.     No past medical history on file.  No past surgical history on file.  No family history on file.  Social History     Socioeconomic History    Marital status:      Spouse name: Not on file    Number of children: Not on file    Years of education: Not on file    Highest education level: Not on file   Occupational History    Not on file   Tobacco Use    Smoking status: Not on file    Smokeless tobacco: Not on file   Substance and Sexual Activity    Alcohol use: Not on file    Drug use: Not on file    Sexual activity: Not on file   Other Topics Concern    Not on file   Social History Narrative    Not on file     Social Determinants of Health     Financial Resource Strain: Not on file   Food Insecurity: Not on file   Transportation Needs: Not on file   Physical Activity: Not on file   Stress: Not on file   Social Connections: Not on file   Intimate Partner Violence: Not on file   Housing Stability: Not on file       Allergies: Patient has no known allergies.  NF MEDICATIONS REVIEWED    ROS:  See HPI  Constitutional: There are no reports of behavioral issues, change in appetite, fever, or weakness. No gross bleeding issues.  Respiratory: denies SOB, dyspnea, dyspnea on exertion,   Cardiovascular: denies CP, lightheadedness,   GI: No reports of change of bowel habits, no N/V/D/C.   : no reports of change in bladder habits  Extremities: No reports of pain issues, no edema    Physical exam:   /70, temperature 97.5, heart rate 68, respirations 17, spo2 97%, weight 95.9lbs  Constitutional: Alert, elderly, frail female, sitting in recliner, in no apparent distress  HEENT: normocephalic, [place], MMM, no cyanosis. NO neck mass visualized   Cardiovascular: Regular rate  Respiratory:

## 2024-04-10 LAB
ACANTHOCYTES BLD QL SMEAR: ABNORMAL
ANION GAP SERPL CALCULATED.3IONS-SCNC: 10 MEQ/L (ref 9–15)
BASOPHILS # BLD: 0 K/UL (ref 0–0.2)
BASOPHILS NFR BLD: 0.7 %
BUN SERPL-MCNC: 21 MG/DL (ref 8–23)
CALCIUM SERPL-MCNC: 9.3 MG/DL (ref 8.5–9.9)
CHLORIDE SERPL-SCNC: 105 MEQ/L (ref 95–107)
CO2 SERPL-SCNC: 30 MEQ/L (ref 20–31)
CREAT SERPL-MCNC: 0.56 MG/DL (ref 0.5–0.9)
EOSINOPHIL # BLD: 0 K/UL (ref 0–0.7)
EOSINOPHIL NFR BLD: 1 %
ERYTHROCYTE [DISTWIDTH] IN BLOOD BY AUTOMATED COUNT: 12.7 % (ref 11.5–14.5)
GLUCOSE SERPL-MCNC: 76 MG/DL (ref 70–99)
HCT VFR BLD AUTO: 33.4 % (ref 37–47)
HGB BLD-MCNC: 10.7 G/DL (ref 12–16)
LYMPHOCYTES # BLD: 1 K/UL (ref 1–4.8)
LYMPHOCYTES NFR BLD: 36 %
MCH RBC QN AUTO: 29.8 PG (ref 27–31.3)
MCHC RBC AUTO-ENTMCNC: 32 % (ref 33–37)
MCV RBC AUTO: 93 FL (ref 79.4–94.8)
MONOCYTES # BLD: 0.1 K/UL (ref 0.2–0.8)
MONOCYTES NFR BLD: 3.7 %
NEUTROPHILS # BLD: 1.6 K/UL (ref 1.4–6.5)
NEUTS SEG NFR BLD: 59 %
PLATELET # BLD AUTO: 119 K/UL (ref 130–400)
PLATELET BLD QL SMEAR: ABNORMAL
POIKILOCYTOSIS BLD QL SMEAR: ABNORMAL
POTASSIUM SERPL-SCNC: 4 MEQ/L (ref 3.4–4.9)
RBC # BLD AUTO: 3.59 M/UL (ref 4.2–5.4)
SODIUM SERPL-SCNC: 145 MEQ/L (ref 135–144)
WBC # BLD AUTO: 2.7 K/UL (ref 4.8–10.8)

## 2024-05-10 LAB
ANION GAP SERPL CALCULATED.3IONS-SCNC: 13 MEQ/L (ref 9–15)
BASOPHILS # BLD: 0 K/UL (ref 0–0.2)
BASOPHILS NFR BLD: 1 %
BUN SERPL-MCNC: 20 MG/DL (ref 8–23)
CALCIUM SERPL-MCNC: 9.2 MG/DL (ref 8.5–9.9)
CHLORIDE SERPL-SCNC: 103 MEQ/L (ref 95–107)
CO2 SERPL-SCNC: 26 MEQ/L (ref 20–31)
CREAT SERPL-MCNC: 0.56 MG/DL (ref 0.5–0.9)
EOSINOPHIL # BLD: 0 K/UL (ref 0–0.7)
EOSINOPHIL NFR BLD: 0.3 %
ERYTHROCYTE [DISTWIDTH] IN BLOOD BY AUTOMATED COUNT: 13.3 % (ref 11.5–14.5)
GLUCOSE SERPL-MCNC: 127 MG/DL (ref 70–99)
HCT VFR BLD AUTO: 35.7 % (ref 37–47)
HGB BLD-MCNC: 11.8 G/DL (ref 12–16)
LYMPHOCYTES # BLD: 2 K/UL (ref 1–4.8)
LYMPHOCYTES NFR BLD: 61 %
MCH RBC QN AUTO: 30.2 PG (ref 27–31.3)
MCHC RBC AUTO-ENTMCNC: 33.1 % (ref 33–37)
MCV RBC AUTO: 91.3 FL (ref 79.4–94.8)
MONOCYTES # BLD: 0 K/UL (ref 0.2–0.8)
MONOCYTES NFR BLD: 1 %
NEUTROPHILS # BLD: 1.2 K/UL (ref 1.4–6.5)
NEUTS SEG NFR BLD: 37 %
PLATELET # BLD AUTO: 129 K/UL (ref 130–400)
PLATELET BLD QL SMEAR: ABNORMAL
POTASSIUM SERPL-SCNC: 4.5 MEQ/L (ref 3.4–4.9)
RBC # BLD AUTO: 3.91 M/UL (ref 4.2–5.4)
SLIDE REVIEW: ABNORMAL
SMUDGE CELLS BLD QL SMEAR: 7.8
SODIUM SERPL-SCNC: 142 MEQ/L (ref 135–144)
WBC # BLD AUTO: 3.2 K/UL (ref 4.8–10.8)

## 2024-05-16 ENCOUNTER — OFFICE VISIT (OUTPATIENT)
Dept: GERIATRIC MEDICINE | Age: 89
End: 2024-05-16

## 2024-05-16 DIAGNOSIS — G20.A1 PARKINSON'S DISEASE, UNSPECIFIED WHETHER DYSKINESIA PRESENT, UNSPECIFIED WHETHER MANIFESTATIONS FLUCTUATE (HCC): Primary | ICD-10-CM

## 2024-05-16 DIAGNOSIS — E11.9 TYPE 2 DIABETES MELLITUS WITHOUT COMPLICATION, UNSPECIFIED WHETHER LONG TERM INSULIN USE (HCC): ICD-10-CM

## 2024-05-16 DIAGNOSIS — M62.81 MUSCLE WEAKNESS: ICD-10-CM

## 2024-05-28 PROBLEM — R41.89 UNRESPONSIVE: Status: ACTIVE | Noted: 2024-01-01

## 2024-05-28 NOTE — NURSING NOTE
ED 11 ZULEMA Tirado  Met with family at bedside to discuss hospice services, philosophy, and plan of care. Patient actively passing. It was decided to review GIP admission tomorrow if patient survives the night. Family asking to keep non re breather in place until additional family members can be present. No DPOAHC on file- would need 2/3 children to consent for hospice. Son at bedside and in agreement but 2 daughters currently in [place]- it was reviewed with [facility] supervisor that an attempt to reach daughter would need to be made for consents to be signed. Family provided [facility] contact information.     Family will still be eligible for bereavement services due to active referral.   [facility] to place phone call tomorrow to follow up.    Update provided to:  Dr Remberto Mehta, bedside RN      Almita Weston, [facility] RN  [phone]

## 2024-05-28 NOTE — PROGRESS NOTES
I was called to the bedside of Nadia Tirado to evaluate her.  Patient had no spontaneous respirations.  No lung sounds noted on auscultation.  No heart sounds noted on auscultation.  Pupils fixed dilated nonreactive.  No corneal reflex present.  No cardiac activity noted on bedside ultrasound.  TOPRINCESS 1916 hrs.

## 2024-05-28 NOTE — ED PROVIDER NOTES
"HPI   Chief Complaint   Patient presents with    Altered Mental Status     Pt found unresponsive by squad from the [facility]. Hypotensive at 86/40. Blood Glucose 274. SpO2 at 86% on 15L.       Patient is 89-year-old female presenting to the emergency department for complaints of unresponsiveness.  Patient was found hypotensive hypoxic by EMS.  Patient arrived to the emergency department on 15 L nonrebreather.  EMS was unable to obtain IV access.  Patient would moan to painful stimuli.  Family who arrived states that she was \"not doing well\" yesterday.  They describe that she was more lethargic than usual and today she became completely unresponsive.  There was no previously reported complaints of chest pain or difficulty breathing.  No falls or traumatic injuries reported.      History provided by:  EMS personnel, nursing home and relative  History limited by:  Unstable vital signs and patient unresponsive                      Yann Coma Scale Score: 4                     Patient History   No past medical history on file.  No past surgical history on file.  No family history on file.  Social History     Tobacco Use    Smoking status: Not on file    Smokeless tobacco: Not on file   Substance Use Topics    Alcohol use: Not on file    Drug use: Not on file       Physical Exam   ED Triage Vitals   Temperature Heart Rate Respirations BP   05/28/24 1315 05/28/24 1313 05/28/24 1313 05/28/24 1313   36.3 °C (97.3 °F) 84 16 (!) 86/40      Pulse Ox Temp src Heart Rate Source Patient Position   05/28/24 1313 -- -- --   (!) 90 %         BP Location FiO2 (%)     -- --             Physical Exam  Vitals and nursing note reviewed.   Constitutional:       General: She is in acute distress.      Appearance: She is ill-appearing, toxic-appearing and diaphoretic.   HENT:      Head: Normocephalic and atraumatic.      Mouth/Throat:      Pharynx: Oropharynx is clear.   Eyes:      General: No scleral icterus.     Pupils: Pupils are " equal, round, and reactive to light.   Cardiovascular:      Rate and Rhythm: Normal rate and regular rhythm.      Heart sounds: No murmur heard.     No friction rub. No gallop.   Pulmonary:      Effort: Respiratory distress present.      Breath sounds: Rales present.   Abdominal:      General: There is no distension.      Palpations: Abdomen is soft. There is no mass.      Tenderness: There is no abdominal tenderness.   Musculoskeletal:         General: No swelling or tenderness.      Cervical back: Neck supple.   Skin:     General: Skin is warm.      Capillary Refill: Capillary refill takes more than 3 seconds.      Coloration: Skin is pale.   Neurological:      Mental Status: She is unresponsive.       ED Course & MDM   Diagnoses as of 05/28/24 1921   Unresponsive       Medical Decision Making  Patient is a 89-year-old female presenting to the emergency department for complaints of unresponsiveness.  Differential includes infection/sepsis, ACS, respiratory failure, kidney failure, heart failure, electrolyte abnormalities, stroke.  Labs imaging IV fluids and antibiotics were ordered with these differentials of mine.  Patient was only responsive to painful stimuli by moaning.  No meaningful responses.  Patient was hypotensive and hypoxic.  Family at bedside stating that the patient absolutely would not want to be intubated.  Nursing home paperwork indicates that the patient is DNR but no actual DNR form was present.  Nursing home was contacted and this form was requested while the workup was initiated.  Form was ultimately faxed the patient was DNR CC.  I reviewed this with the family and they state that this is in accordance with the patient's wishes.  Patient had hypoxic hypercapnic respiratory failure on ABG.  COVID was not detected.  CBC without leukocytosis anemia or thrombocytopenia.  Metabolic panel with elevated kidney function believed be secondary to the hypotension.  Lactate was elevated 2.5 also believed  to be secondary to the hypotension.  BNP was elevated at 907 which is elevated from previous indicating component of possible heart failure.  Patient's chest x-ray did have enlarged cardiac silhouette and parenchymal infiltrates.  Uncertain if this is infection versus aspiration.  Patient was given antibiotics to cover for possible infection.  Patient's EKG was nonischemic and patient had elevated troponin of 88 with repeat of 68.  We switched to comfort care and hospice was stat consulted due to the patient's DNR CC paperwork.  Uncertain as to the ETA for hospice and thus patient's PCP Dr. Ashley at was contacted for admission.  Case discussed and he agreed to accept the patient for admission.     Amount and/or Complexity of Data Reviewed  Labs: ordered. Decision-making details documented in ED Course.  Radiology: ordered. Decision-making details documented in ED Course.  ECG/medicine tests: independent interpretation performed.     Details: Sinus rhythm with a first-degree AV block.  Ventricular rate of 89 bpm.  QRS interval 114 ms.  QTc 399 ms.  Left axis deviation present.      Labs Reviewed   CBC WITH AUTO DIFFERENTIAL - Abnormal       Result Value    WBC 8.9      nRBC 0.0      RBC 4.02      Hemoglobin 12.1      Hematocrit 38.4      MCV 96      MCH 30.1      MCHC 31.5 (*)     RDW 14.3      Platelets 173      Immature Granulocytes %, Automated 2.1 (*)     Immature Granulocytes Absolute, Automated 0.19      Narrative:     The previously reported component Neutrophils % is no longer being reported.  The previously reported component Lymphocytes % is no longer being reported.  The previously reported component Monocytes % is no longer being reported.  The previously   reported component Eosinophils % is no longer being reported.  The previously reported component Basophils % is no longer being reported.  The previously reported component Absolute Neutrophils is no longer being reported.  The previously reported    component Absolute Lymphocytes is no longer being reported.  The previously reported component Absolute Monocytes is no longer being reported.  The previously reported component Absolute Eosinophils is no longer being reported.  The previously reported   component Absolute Basophils is no longer being reported.   COMPREHENSIVE METABOLIC PANEL - Abnormal    Glucose 206 (*)     Sodium 146 (*)     Potassium 5.1      Chloride 107      Bicarbonate 30      Anion Gap 14      Urea Nitrogen 59 (*)     Creatinine 1.57 (*)     eGFR 30 (*)     Calcium 9.8      Albumin 3.4      Alkaline Phosphatase 59      Total Protein 7.4      AST 10      Bilirubin, Total 0.6      ALT <3 (*)    LACTATE - Abnormal    Lactate 2.5 (*)     Narrative:     Venipuncture immediately after or during the administration of Metamizole may lead to falsely low results. Testing should be performed immediately  prior to Metamizole dosing.   LIPASE - Abnormal    Lipase <3 (*)     Narrative:     Venipuncture immediately after or during the administration of Metamizole may lead to falsely low results. Testing should be performed immediately prior to Metamizole dosing.   MAGNESIUM - Abnormal    Magnesium 2.48 (*)    B-TYPE NATRIURETIC PEPTIDE - Abnormal     (*)     Narrative:        <100 pg/mL - Heart failure unlikely  100-299 pg/mL - Intermediate probability of acute heart                  failure exacerbation. Correlate with clinical                  context and patient history.    >=300 pg/mL - Heart Failure likely. Correlate with clinical                  context and patient history.    BNP testing is performed using different testing methodology at [facility] than at other [facility]. Direct result comparisons should only be made within the same method.      SERIAL TROPONIN-INITIAL - Abnormal    Troponin I, High Sensitivity 88 (*)     Narrative:     Less than 99th percentile of normal range cutoff-  Female and children under 18  years old <14 ng/L; Male <21 ng/L: Negative  Repeat testing should be performed if clinically indicated.     Female and children under 18 years old 14-50 ng/L; Male 21-50 ng/L:  Consistent with possible cardiac damage and possible increased clinical   risk. Serial measurements may help to assess extent of myocardial damage.     >50 ng/L: Consistent with cardiac damage, increased clinical risk and  myocardial infarction. Serial measurements may help assess extent of   myocardial damage.      NOTE: Children less than 1 year old may have higher baseline troponin   levels and results should be interpreted in conjunction with the overall   clinical context.     NOTE: Troponin I testing is performed using a different   testing methodology at [facility] than at other   [facility]. Direct result comparisons should only   be made within the same method.   SERIAL TROPONIN, 1 HOUR - Abnormal    Troponin I, High Sensitivity 68 (*)     Narrative:     Less than 99th percentile of normal range cutoff-  Female and children under 18 years old <14 ng/L; Male <21 ng/L: Negative  Repeat testing should be performed if clinically indicated.     Female and children under 18 years old 14-50 ng/L; Male 21-50 ng/L:  Consistent with possible cardiac damage and possible increased clinical   risk. Serial measurements may help to assess extent of myocardial damage.     >50 ng/L: Consistent with cardiac damage, increased clinical risk and  myocardial infarction. Serial measurements may help assess extent of   myocardial damage.      NOTE: Children less than 1 year old may have higher baseline troponin   levels and results should be interpreted in conjunction with the overall   clinical context.     NOTE: Troponin I testing is performed using a different   testing methodology at [facility] than at other   [facility]. Direct result comparisons should only   be made within the same method.   BLOOD GAS ARTERIAL  FULL PANEL - Abnormal    POCT pH, Arterial 7.32 (*)     POCT pCO2, Arterial 65 (*)     POCT pO2, Arterial 66 (*)     POCT SO2, Arterial 93 (*)     POCT Oxy Hemoglobin, Arterial 91.3 (*)     POCT Hematocrit Calculated, Arterial 36.0      POCT Sodium, Arterial 143      POCT Potassium, Arterial 5.2      POCT Chloride, Arterial 109 (*)     POCT Ionized Calcium, Arterial 1.31      POCT Glucose, Arterial 228 (*)     POCT Lactate, Arterial 2.8 (*)     POCT Base Excess, Arterial 5.6 (*)     POCT HCO3 Calculated, Arterial 33.5 (*)     POCT Hemoglobin, Arterial 12.0      POCT Anion Gap, Arterial 6 (*)     Patient Temperature        FiO2 100      Apparatus NON REBREATHER      Site of Arterial Puncture Radial Left     POCT GLUCOSE - Abnormal    POCT Glucose 202 (*)    MANUAL DIFFERENTIAL - Abnormal    Neutrophils %, Manual 43.0      Bands %, Manual 26.0      Lymphocytes %, Manual 9.0      Monocytes %, Manual 12.0      Eosinophils %, Manual 0.0      Basophils %, Manual 0.0      Metamyelocytes %, Manual 9.0      Myelocytes %, Manual 1.0      Seg Neutrophils Absolute, Manual 3.83      Bands Absolute, Manual 2.31 (*)     Lymphocytes Absolute, Manual 0.80      Monocytes Absolute, Manual 1.07 (*)     Eosinophils Absolute, Manual 0.00      Basophils Absolute, Manual 0.00      Metamyelocytes Absolute, Manual 0.80      Myelocytes Absolute, Manual 0.09      Total Cells Counted 100      Neutrophils Absolute, Manual 6.14 (*)     RBC Morphology See Below      Ovalocytes Few      Augustina Cells Few     BLOOD CULTURE - Normal    Blood Culture Loaded on Instrument - Culture in progress     BLOOD CULTURE - Normal    Blood Culture Loaded on Instrument - Culture in progress     SARS-COV-2 PCR - Normal    Coronavirus 2019, PCR Not Detected      Narrative:     This assay has received FDA Emergency Use Authorization (EUA) and is only authorized for the duration of time that circumstances exist to justify the authorization of the emergency use of in  vitro diagnostic tests for the detection of SARS-CoV-2 virus and/or diagnosis of COVID-19 infection under section 564(b)(1) of the Act, 21 U.S.C. 360bbb-3(b)(1). This assay is an in vitro diagnostic nucleic acid amplification test for the qualitative detection of SARS-CoV-2 from nasopharyngeal specimens and has been validated for use at [facility]. Negative results do not preclude COVID-19 infections and should not be used as the sole basis for diagnosis, treatment, or other management decisions.     PROTIME-INR - Normal    Protime 12.3      INR 1.1     TROPONIN SERIES- (INITIAL, 1 HR)    Narrative:     The following orders were created for panel order Troponin I Series, High Sensitivity (0, 1 HR).  Procedure                               Abnormality         Status                     ---------                               -----------         ------                     Troponin I, High Sensiti...[[id number]]  Abnormal            Final result               Troponin, High Sensitivi...[[id number]]  Abnormal            Final result                 Please view results for these tests on the individual orders.   URINALYSIS WITH REFLEX CULTURE AND MICROSCOPIC    Narrative:     The following orders were created for panel order Urinalysis with Reflex Culture and Microscopic.  Procedure                               Abnormality         Status                     ---------                               -----------         ------                     Urinalysis with Reflex C...[[id number]]                                                 Extra Urine Gray Tube[[id number]]                                                         Please view results for these tests on the individual orders.   URINALYSIS WITH REFLEX CULTURE AND MICROSCOPIC   EXTRA URINE GRAY TUBE   BLOOD GAS LACTIC ACID, VENOUS   LACTATE     XR chest 1 view   Final Result   Interval development of parenchymal infiltrates.        The cardiac  silhouette is enlarged.        Osteoporosis.        MACRO:   none        Signed by: Marci Perez 5/28/2024 2:13 PM   Dictation workstation:   [id number]            Procedure  Critical Care    Performed by: Remberto Sage DO  Authorized by: Remberto Sage DO    Critical care provider statement:     Critical care time (minutes):  30    Critical care time was exclusive of:  Separately billable procedures and treating other patients    Critical care was necessary to treat or prevent imminent or life-threatening deterioration of the following conditions:  Respiratory failure and shock    Critical care was time spent personally by me on the following activities:  Development of treatment plan with patient or surrogate, discussions with primary provider, evaluation of patient's response to treatment, examination of patient, review of old charts, re-evaluation of patient's condition, pulse oximetry, ordering and review of radiographic studies and ordering and review of laboratory studies    Care discussed with: admitting provider         Remberto Sage DO  05/28/24 2042

## 2024-06-02 LAB
B-LACTAMASE ORGANISM ISLT: NEGATIVE
BACTERIA BLD AEROBE CULT: ABNORMAL
BACTERIA BLD AEROBE CULT: ABNORMAL
BACTERIA BLD CULT: ABNORMAL
BACTERIA BLD CULT: ABNORMAL
GRAM STN SPEC: ABNORMAL
GRAM STN SPEC: ABNORMAL

## 2024-06-15 NOTE — PROGRESS NOTES
[facility]  5/16/2024    Antonio Rowell  is a 89 y.o. in the [place] being seen for a f/u of   Chief Complaint   Patient presents with    1 Month Follow-Up       HPI lives in LTC  Being seen monthly for chronic illnesses.     No past medical history on file.  No past surgical history on file.  No family history on file.  Social History     Socioeconomic History    Marital status:      Spouse name: Not on file    Number of children: Not on file    Years of education: Not on file    Highest education level: Not on file   Occupational History    Not on file   Tobacco Use    Smoking status: Not on file    Smokeless tobacco: Not on file   Substance and Sexual Activity    Alcohol use: Not on file    Drug use: Not on file    Sexual activity: Not on file   Other Topics Concern    Not on file   Social History Narrative    Not on file     Social Determinants of Health     Financial Resource Strain: Not on file   Food Insecurity: Not on file   Transportation Needs: Not on file   Physical Activity: Not on file   Stress: Not on file   Social Connections: Not on file   Intimate Partner Violence: Not on file   Housing Stability: Not on file       Allergies: Patient has no known allergies.  NF MEDICATIONS REVIEWED    ROS:  See HPI  Constitutional: There are no reports of behavioral issues, change in appetite, fever, or weakness. No gross bleeding issues.  Respiratory: denies SOB, dyspnea, dyspnea on exertion,   Cardiovascular: denies CP, lightheadedness,   GI: No reports of change of bowel habits, no N/V/D/C.   : no reports of change in bladder habits  Extremities: No reports of pain issues, no edema    Physical exam:  /64, temperature 97.6, heart rate 68, respirations 18, sp02 95%, weight 96lbs  Constitutional: Elderly, frail female, sitting in recliner, in no apparent distress  HEENT: normocephalic, very [place], MMM, no cyanosis. NO neck mass visualized   Cardiovascular: Regular rate  Respiratory: